# Patient Record
Sex: MALE | Race: BLACK OR AFRICAN AMERICAN | NOT HISPANIC OR LATINO | Employment: UNEMPLOYED | URBAN - METROPOLITAN AREA
[De-identification: names, ages, dates, MRNs, and addresses within clinical notes are randomized per-mention and may not be internally consistent; named-entity substitution may affect disease eponyms.]

---

## 2017-07-27 ENCOUNTER — HISTORICAL (OUTPATIENT)
Dept: RADIOLOGY | Facility: HOSPITAL | Age: 47
End: 2017-07-27

## 2017-07-27 LAB
ALBUMIN SERPL-MCNC: 4.1 GM/DL (ref 3.4–5)
ALBUMIN/GLOB SERPL: 1.2 RATIO (ref 1.1–2)
ALP SERPL-CCNC: 48 UNIT/L (ref 50–136)
ALT SERPL-CCNC: 33 UNIT/L (ref 12–78)
AST SERPL-CCNC: 28 UNIT/L (ref 15–37)
BILIRUB SERPL-MCNC: 1.1 MG/DL (ref 0.2–1)
BILIRUBIN DIRECT+TOT PNL SERPL-MCNC: 0.2 MG/DL (ref 0–0.5)
BILIRUBIN DIRECT+TOT PNL SERPL-MCNC: 0.9 MG/DL (ref 0–0.8)
BUN SERPL-MCNC: 15 MG/DL (ref 7–18)
CALCIUM SERPL-MCNC: 9 MG/DL (ref 8.5–10.1)
CHLORIDE SERPL-SCNC: 102 MMOL/L (ref 98–107)
CO2 SERPL-SCNC: 30 MMOL/L (ref 21–32)
CREAT SERPL-MCNC: 1.38 MG/DL (ref 0.7–1.3)
CREAT UR-MCNC: 30.2 MG/DL
ERYTHROCYTE [DISTWIDTH] IN BLOOD BY AUTOMATED COUNT: 14 % (ref 11.5–17)
GLOBULIN SER-MCNC: 3.4 GM/DL (ref 2.4–3.5)
GLUCOSE SERPL-MCNC: 84 MG/DL (ref 74–106)
HCT VFR BLD AUTO: 49.1 % (ref 42–52)
HGB BLD-MCNC: 17 GM/DL (ref 14–18)
MAGNESIUM SERPL-MCNC: 2.2 MG/DL (ref 1.8–2.4)
MCH RBC QN AUTO: 30.6 PG (ref 27–31)
MCHC RBC AUTO-ENTMCNC: 34.6 GM/DL (ref 33–36)
MCV RBC AUTO: 88.5 FL (ref 80–94)
PHOSPHATE SERPL-MCNC: 2.8 MG/DL (ref 2.5–4.9)
PLATELET # BLD AUTO: 192 X10(3)/MCL (ref 130–400)
PMV BLD AUTO: 11.5 FL (ref 9.4–12.4)
POTASSIUM SERPL-SCNC: 4.5 MMOL/L (ref 3.5–5.1)
PROT SERPL-MCNC: 7.5 GM/DL (ref 6.4–8.2)
PROT UR STRIP-MCNC: <5 MG/DL
PROT/CREAT UR-RTO: <0.2 MG/DL
PTH-INTACT SERPL-MCNC: 63 PG/DL (ref 14–72)
RBC # BLD AUTO: 5.55 X10(6)/MCL (ref 4.7–6.1)
SODIUM SERPL-SCNC: 140 MMOL/L (ref 136–145)
TSH SERPL-ACNC: 1.06 MIU/ML (ref 0.36–3.74)
URATE SERPL-MCNC: 6.2 MG/DL (ref 2.6–7.2)
WBC # SPEC AUTO: 5.9 X10(3)/MCL (ref 4.5–11.5)

## 2017-08-17 ENCOUNTER — HISTORICAL (OUTPATIENT)
Dept: ADMINISTRATIVE | Facility: HOSPITAL | Age: 47
End: 2017-08-17

## 2017-08-17 LAB
BUN SERPL-MCNC: 15 MG/DL (ref 7–18)
CALCIUM SERPL-MCNC: 8.8 MG/DL (ref 8.5–10.1)
CHLORIDE SERPL-SCNC: 106 MMOL/L (ref 98–107)
CO2 SERPL-SCNC: 28 MMOL/L (ref 21–32)
CREAT SERPL-MCNC: 1.32 MG/DL (ref 0.7–1.3)
GLUCOSE SERPL-MCNC: 105 MG/DL (ref 74–106)
POTASSIUM SERPL-SCNC: 4.2 MMOL/L (ref 3.5–5.1)
SODIUM SERPL-SCNC: 140 MMOL/L (ref 136–145)

## 2018-05-25 ENCOUNTER — HISTORICAL (OUTPATIENT)
Dept: ADMINISTRATIVE | Facility: HOSPITAL | Age: 48
End: 2018-05-25

## 2018-05-25 LAB
ALBUMIN SERPL-MCNC: 4.1 GM/DL (ref 3.4–5)
ALBUMIN/GLOB SERPL: 1.3 {RATIO}
ALP SERPL-CCNC: 49 UNIT/L (ref 50–136)
ALT SERPL-CCNC: 33 UNIT/L (ref 12–78)
AST SERPL-CCNC: 26 UNIT/L (ref 15–37)
BILIRUB SERPL-MCNC: 0.6 MG/DL (ref 0.2–1)
BILIRUBIN DIRECT+TOT PNL SERPL-MCNC: 0.2 MG/DL (ref 0–0.2)
BILIRUBIN DIRECT+TOT PNL SERPL-MCNC: 0.4 MG/DL (ref 0–0.8)
BUN SERPL-MCNC: 14 MG/DL (ref 7–18)
CALCIUM SERPL-MCNC: 8.6 MG/DL (ref 8.5–10.1)
CHLORIDE SERPL-SCNC: 105 MMOL/L (ref 98–107)
CHOLEST SERPL-MCNC: 145 MG/DL (ref 0–200)
CHOLEST/HDLC SERPL: 4.3 {RATIO} (ref 0–5)
CO2 SERPL-SCNC: 30 MMOL/L (ref 21–32)
CREAT SERPL-MCNC: 1.33 MG/DL (ref 0.7–1.3)
ERYTHROCYTE [DISTWIDTH] IN BLOOD BY AUTOMATED COUNT: 14.1 % (ref 11.5–17)
GLOBULIN SER-MCNC: 3.2 GM/DL (ref 2.4–3.5)
GLUCOSE SERPL-MCNC: 99 MG/DL (ref 74–106)
HCT VFR BLD AUTO: 49.2 % (ref 42–52)
HDLC SERPL-MCNC: 34 MG/DL (ref 35–60)
HGB BLD-MCNC: 16.9 GM/DL (ref 14–18)
LDLC SERPL CALC-MCNC: 92 MG/DL (ref 0–129)
MCH RBC QN AUTO: 31.2 PG (ref 27–31)
MCHC RBC AUTO-ENTMCNC: 34.3 GM/DL (ref 33–36)
MCV RBC AUTO: 90.8 FL (ref 80–94)
PLATELET # BLD AUTO: 181 X10(3)/MCL (ref 130–400)
PMV BLD AUTO: 10.5 FL (ref 9.4–12.4)
POTASSIUM SERPL-SCNC: 4.4 MMOL/L (ref 3.5–5.1)
PROT SERPL-MCNC: 7.3 GM/DL (ref 6.4–8.2)
PSA SERPL-MCNC: 1.53 NG/ML (ref 0–4)
RBC # BLD AUTO: 5.42 X10(6)/MCL (ref 4.7–6.1)
SODIUM SERPL-SCNC: 141 MMOL/L (ref 136–145)
TRIGL SERPL-MCNC: 97 MG/DL (ref 30–150)
VLDLC SERPL CALC-MCNC: 19 MG/DL
WBC # SPEC AUTO: 4.6 X10(3)/MCL (ref 4.5–11.5)

## 2018-06-04 ENCOUNTER — HISTORICAL (OUTPATIENT)
Dept: RADIOLOGY | Facility: HOSPITAL | Age: 48
End: 2018-06-04

## 2019-12-11 ENCOUNTER — HISTORICAL (OUTPATIENT)
Dept: ADMINISTRATIVE | Facility: HOSPITAL | Age: 49
End: 2019-12-11

## 2019-12-11 LAB
ABS NEUT (OLG): 2.95 X10(3)/MCL (ref 2.1–9.2)
BUN SERPL-MCNC: 12 MG/DL (ref 7–18)
CALCIUM SERPL-MCNC: 9.1 MG/DL (ref 8.5–10.1)
CHLORIDE SERPL-SCNC: 103 MMOL/L (ref 98–107)
CHOLEST SERPL-MCNC: 132 MG/DL (ref 0–200)
CHOLEST/HDLC SERPL: 4.9 {RATIO} (ref 0–5)
CO2 SERPL-SCNC: 30 MMOL/L (ref 21–32)
CREAT SERPL-MCNC: 1.29 MG/DL (ref 0.7–1.3)
CREAT/UREA NIT SERPL: 9.3
DACRYOCYTES BLD QL SMEAR: 1 /MCL (ref 0–1)
ERYTHROCYTE [DISTWIDTH] IN BLOOD BY AUTOMATED COUNT: 13.8 % (ref 11.5–17)
GLUCOSE SERPL-MCNC: 85 MG/DL (ref 74–106)
H PYLORI AB SER IA-ACNC: NEGATIVE
HCT VFR BLD AUTO: 47.9 % (ref 42–52)
HDLC SERPL-MCNC: 27 MG/DL (ref 35–60)
HGB BLD-MCNC: 16.1 GM/DL (ref 14–18)
LDLC SERPL CALC-MCNC: 83 MG/DL (ref 0–129)
LYMPHOCYTES NFR BLD MANUAL: 29 % (ref 13–40)
MCH RBC QN AUTO: 29.7 PG (ref 27–31)
MCHC RBC AUTO-ENTMCNC: 33.6 GM/DL (ref 33–36)
MCV RBC AUTO: 88.2 FL (ref 80–94)
MONOCYTES NFR BLD MANUAL: 5 % (ref 2–11)
NEUTROPHILS NFR BLD MANUAL: 66 % (ref 47–80)
PLATELET # BLD AUTO: 197 X10(3)/MCL (ref 130–400)
PLATELET # BLD EST: NORMAL 10*3/UL
PMV BLD AUTO: 10.4 FL (ref 9.4–12.4)
POTASSIUM SERPL-SCNC: 4.3 MMOL/L (ref 3.5–5.1)
PSA SERPL-MCNC: 2.87 NG/ML (ref 0–4)
RBC # BLD AUTO: 5.43 X10(6)/MCL (ref 4.7–6.1)
SODIUM SERPL-SCNC: 138 MMOL/L (ref 136–145)
TRIGL SERPL-MCNC: 111 MG/DL (ref 30–150)
TSH SERPL-ACNC: 1.18 MIU/L (ref 0.36–3.74)
VLDLC SERPL CALC-MCNC: 22 MG/DL
WBC # SPEC AUTO: 4.6 X10(3)/MCL (ref 4.5–11.5)

## 2021-01-22 ENCOUNTER — HISTORICAL (OUTPATIENT)
Dept: ADMINISTRATIVE | Facility: HOSPITAL | Age: 51
End: 2021-01-22

## 2021-01-22 LAB
ALBUMIN SERPL-MCNC: 4.1 GM/DL (ref 3.5–5)
ALP SERPL-CCNC: 51 UNIT/L (ref 40–150)
ALT SERPL-CCNC: 26 UNIT/L (ref 0–55)
AST SERPL-CCNC: 24 UNIT/L (ref 5–34)
BILIRUB SERPL-MCNC: 1 MG/DL
BILIRUBIN DIRECT+TOT PNL SERPL-MCNC: 0.4 MG/DL (ref 0–0.5)
BILIRUBIN DIRECT+TOT PNL SERPL-MCNC: 0.6 MG/DL (ref 0–0.8)
CHOLEST SERPL-MCNC: 149 MG/DL
CHOLEST/HDLC SERPL: 4 {RATIO} (ref 0–5)
HDLC SERPL-MCNC: 37 MG/DL (ref 35–60)
LDLC SERPL CALC-MCNC: 93 MG/DL (ref 50–140)
LIVER PROFILE INTERP: NORMAL
PROT SERPL-MCNC: 6.8 GM/DL (ref 6.4–8.3)
TRIGL SERPL-MCNC: 93 MG/DL (ref 34–140)
VLDLC SERPL CALC-MCNC: 19 MG/DL

## 2021-09-09 ENCOUNTER — HISTORICAL (OUTPATIENT)
Dept: ADMINISTRATIVE | Facility: HOSPITAL | Age: 51
End: 2021-09-09

## 2021-09-09 LAB
ABS NEUT (OLG): 2.98 X10(3)/MCL (ref 2.1–9.2)
ALBUMIN SERPL-MCNC: 4.1 GM/DL (ref 3.5–5)
ALBUMIN/GLOB SERPL: 1.5 RATIO (ref 1.1–2)
ALP SERPL-CCNC: 49 UNIT/L (ref 40–150)
ALT SERPL-CCNC: 18 UNIT/L (ref 0–55)
AST SERPL-CCNC: 21 UNIT/L (ref 5–34)
BASOPHILS # BLD AUTO: 0 X10(3)/MCL (ref 0–0.2)
BASOPHILS NFR BLD AUTO: 0 %
BILIRUB SERPL-MCNC: 1.3 MG/DL
BILIRUBIN DIRECT+TOT PNL SERPL-MCNC: 0.4 MG/DL (ref 0–0.5)
BILIRUBIN DIRECT+TOT PNL SERPL-MCNC: 0.9 MG/DL (ref 0–0.8)
BUN SERPL-MCNC: 14.1 MG/DL (ref 8.9–20.6)
CALCIUM SERPL-MCNC: 9.2 MG/DL (ref 8.4–10.2)
CHLORIDE SERPL-SCNC: 105 MMOL/L (ref 98–107)
CHOLEST SERPL-MCNC: 154 MG/DL
CHOLEST/HDLC SERPL: 5 {RATIO} (ref 0–5)
CO2 SERPL-SCNC: 28 MMOL/L (ref 22–29)
CREAT SERPL-MCNC: 1.38 MG/DL (ref 0.73–1.18)
DEPRECATED CALCIDIOL+CALCIFEROL SERPL-MC: 14.8 NG/ML (ref 30–80)
EOSINOPHIL # BLD AUTO: 0.1 X10(3)/MCL (ref 0–0.9)
EOSINOPHIL NFR BLD AUTO: 2 %
ERYTHROCYTE [DISTWIDTH] IN BLOOD BY AUTOMATED COUNT: 14.3 % (ref 11.5–17)
EST. AVERAGE GLUCOSE BLD GHB EST-MCNC: 108.3 MG/DL
GLOBULIN SER-MCNC: 2.7 GM/DL (ref 2.4–3.5)
GLUCOSE SERPL-MCNC: 98 MG/DL (ref 74–100)
GROUP & RH: NORMAL
HBA1C MFR BLD: 5.4 %
HCT VFR BLD AUTO: 51 % (ref 42–52)
HDLC SERPL-MCNC: 34 MG/DL (ref 35–60)
HGB BLD-MCNC: 17.7 GM/DL (ref 14–18)
LDLC SERPL CALC-MCNC: 100 MG/DL (ref 50–140)
LYMPHOCYTES # BLD AUTO: 1.1 X10(3)/MCL (ref 0.6–4.6)
LYMPHOCYTES NFR BLD AUTO: 25 %
MCH RBC QN AUTO: 30.2 PG (ref 27–31)
MCHC RBC AUTO-ENTMCNC: 34.7 GM/DL (ref 33–36)
MCV RBC AUTO: 87 FL (ref 80–94)
MONOCYTES # BLD AUTO: 0.4 X10(3)/MCL (ref 0.1–1.3)
MONOCYTES NFR BLD AUTO: 8 %
NEUTROPHILS # BLD AUTO: 2.98 X10(3)/MCL (ref 2.1–9.2)
NEUTROPHILS NFR BLD AUTO: 65 %
PLATELET # BLD AUTO: 183 X10(3)/MCL (ref 130–400)
PMV BLD AUTO: 12.2 FL (ref 9.4–12.4)
POTASSIUM SERPL-SCNC: 4.4 MMOL/L (ref 3.5–5.1)
PROT SERPL-MCNC: 6.8 GM/DL (ref 6.4–8.3)
RBC # BLD AUTO: 5.86 X10(6)/MCL (ref 4.7–6.1)
SODIUM SERPL-SCNC: 140 MMOL/L (ref 136–145)
TRIGL SERPL-MCNC: 101 MG/DL (ref 34–140)
TSH SERPL-ACNC: 1.22 UIU/ML (ref 0.35–4.94)
VLDLC SERPL CALC-MCNC: 20 MG/DL
WBC # SPEC AUTO: 4.6 X10(3)/MCL (ref 4.5–11.5)

## 2021-12-21 LAB — CRC RECOMMENDATION EXT: NORMAL

## 2022-03-09 ENCOUNTER — HISTORICAL (OUTPATIENT)
Dept: LAB | Facility: HOSPITAL | Age: 52
End: 2022-03-09

## 2022-03-09 LAB — BNP BLD-MCNC: 1270.7 PG/ML

## 2022-04-01 ENCOUNTER — HISTORICAL (OUTPATIENT)
Dept: ADMINISTRATIVE | Facility: HOSPITAL | Age: 52
End: 2022-04-01

## 2022-04-01 ENCOUNTER — HISTORICAL (OUTPATIENT)
Dept: RADIOLOGY | Facility: HOSPITAL | Age: 52
End: 2022-04-01

## 2022-04-07 ENCOUNTER — HISTORICAL (OUTPATIENT)
Dept: ADMINISTRATIVE | Facility: HOSPITAL | Age: 52
End: 2022-04-07

## 2022-04-07 LAB
ALBUMIN SERPL-MCNC: 3.8 G/DL (ref 3.5–5)
ALBUMIN/GLOB SERPL: 1.5 {RATIO} (ref 1.1–2)
ALP SERPL-CCNC: 46 U/L (ref 40–150)
ALT SERPL-CCNC: 14 U/L (ref 0–55)
AST SERPL-CCNC: 21 U/L (ref 5–34)
BILIRUB SERPL-MCNC: 1.2 MG/DL
BILIRUBIN DIRECT+TOT PNL SERPL-MCNC: 0.4 (ref 0–0.5)
BILIRUBIN DIRECT+TOT PNL SERPL-MCNC: 0.8 (ref 0–0.8)
BUN SERPL-MCNC: 16.2 MG/DL (ref 8.4–25.7)
CALCIUM SERPL-MCNC: 8.9 MG/DL (ref 8.7–10.5)
CHLORIDE SERPL-SCNC: 104 MMOL/L (ref 98–107)
CO2 SERPL-SCNC: 23 MMOL/L (ref 22–29)
CREAT SERPL-MCNC: 1.36 MG/DL (ref 0.73–1.18)
GLOBULIN SER-MCNC: 2.6 G/DL (ref 2.4–3.5)
GLUCOSE SERPL-MCNC: 74 MG/DL (ref 74–100)
HEMOLYSIS INTERF INDEX SERPL-ACNC: 67
ICTERIC INTERF INDEX SERPL-ACNC: 1
LIPEMIC INTERF INDEX SERPL-ACNC: 4
POTASSIUM SERPL-SCNC: 4.6 MMOL/L (ref 3.5–5.1)
PROT SERPL-MCNC: 6.4 G/DL (ref 6.4–8.3)
SODIUM SERPL-SCNC: 136 MMOL/L (ref 136–145)

## 2022-04-10 ENCOUNTER — HISTORICAL (OUTPATIENT)
Dept: ADMINISTRATIVE | Facility: HOSPITAL | Age: 52
End: 2022-04-10
Payer: MEDICAID

## 2022-04-18 ENCOUNTER — HISTORICAL (OUTPATIENT)
Dept: ADMINISTRATIVE | Facility: HOSPITAL | Age: 52
End: 2022-04-18
Payer: MEDICAID

## 2022-04-18 LAB
ABS NEUT (OLG): 3.14 (ref 2.1–9.2)
BASOPHILS # BLD AUTO: 0 10*3/UL (ref 0–0.2)
BASOPHILS NFR BLD AUTO: 0 %
BUN SERPL-MCNC: 17.1 MG/DL (ref 8.4–25.7)
CALCIUM SERPL-MCNC: 9 MG/DL (ref 8.7–10.5)
CHLORIDE SERPL-SCNC: 105 MMOL/L (ref 98–107)
CO2 SERPL-SCNC: 27 MMOL/L (ref 22–29)
CREAT SERPL-MCNC: 1.42 MG/DL (ref 0.73–1.18)
CREAT/UREA NIT SERPL: 12
EOSINOPHIL # BLD AUTO: 0.1 10*3/UL (ref 0–0.9)
EOSINOPHIL NFR BLD AUTO: 2 %
ERYTHROCYTE [DISTWIDTH] IN BLOOD BY AUTOMATED COUNT: 14 % (ref 11.5–17)
GLUCOSE SERPL-MCNC: 85 MG/DL (ref 74–100)
HCT VFR BLD AUTO: 48.7 % (ref 42–52)
HEMOLYSIS INTERF INDEX SERPL-ACNC: 5
HGB BLD-MCNC: 16.9 G/DL (ref 14–18)
ICTERIC INTERF INDEX SERPL-ACNC: 1
LIPEMIC INTERF INDEX SERPL-ACNC: 0
LYMPHOCYTES # BLD AUTO: 0.9 10*3/UL (ref 0.6–4.6)
LYMPHOCYTES NFR BLD AUTO: 20 %
MANUAL DIFF? (OHS): NO
MCH RBC QN AUTO: 30.1 PG (ref 27–31)
MCHC RBC AUTO-ENTMCNC: 34.7 G/DL (ref 33–36)
MCV RBC AUTO: 86.8 FL (ref 80–94)
MONOCYTES # BLD AUTO: 0.4 10*3/UL (ref 0.1–1.3)
MONOCYTES NFR BLD AUTO: 9 %
NEUTROPHILS # BLD AUTO: 3.14 10*3/UL (ref 2.1–9.2)
NEUTROPHILS NFR BLD AUTO: 68 %
PLATELET # BLD AUTO: 176 10*3/UL (ref 130–400)
PMV BLD AUTO: 12.1 FL (ref 9.4–12.4)
POTASSIUM SERPL-SCNC: 4.4 MMOL/L (ref 3.5–5.1)
RBC # BLD AUTO: 5.61 10*6/UL (ref 4.7–6.1)
SODIUM SERPL-SCNC: 138 MMOL/L (ref 136–145)
WBC # SPEC AUTO: 4.6 10*3/UL (ref 4.5–11.5)

## 2022-04-19 LAB — SARS-COV-2 RNA RESP QL NAA+PROBE: NOT DETECTED

## 2022-04-21 ENCOUNTER — HISTORICAL (OUTPATIENT)
Dept: CARDIOLOGY | Facility: HOSPITAL | Age: 52
End: 2022-04-21
Payer: MEDICAID

## 2022-04-28 VITALS
SYSTOLIC BLOOD PRESSURE: 118 MMHG | WEIGHT: 194 LBS | DIASTOLIC BLOOD PRESSURE: 83 MMHG | OXYGEN SATURATION: 99 % | BODY MASS INDEX: 29.4 KG/M2 | HEIGHT: 68 IN

## 2022-05-05 ENCOUNTER — LAB VISIT (OUTPATIENT)
Dept: LAB | Facility: HOSPITAL | Age: 52
End: 2022-05-05
Attending: INTERNAL MEDICINE
Payer: COMMERCIAL

## 2022-05-05 DIAGNOSIS — I48.0 PAROXYSMAL ATRIAL FIBRILLATION: ICD-10-CM

## 2022-05-05 DIAGNOSIS — I42.8 OBSCURE CARDIOMYOPATHY OF AFRICA: Primary | ICD-10-CM

## 2022-05-05 LAB
ANION GAP SERPL CALC-SCNC: 8 MEQ/L
BUN SERPL-MCNC: 13.1 MG/DL (ref 8.4–25.7)
CALCIUM SERPL-MCNC: 9 MG/DL (ref 8.4–10.2)
CHLORIDE SERPL-SCNC: 105 MMOL/L (ref 98–107)
CO2 SERPL-SCNC: 28 MMOL/L (ref 22–29)
CREAT SERPL-MCNC: 1.31 MG/DL (ref 0.73–1.18)
CREAT/UREA NIT SERPL: 10
GLUCOSE SERPL-MCNC: 85 MG/DL (ref 74–100)
POTASSIUM SERPL-SCNC: 4.6 MMOL/L (ref 3.5–5.1)
SODIUM SERPL-SCNC: 141 MMOL/L (ref 136–145)

## 2022-05-05 PROCEDURE — 36415 COLL VENOUS BLD VENIPUNCTURE: CPT

## 2022-05-05 PROCEDURE — 80048 BASIC METABOLIC PNL TOTAL CA: CPT

## 2022-05-19 DIAGNOSIS — I42.8 CARDIOMYOPATHY, NONISCHEMIC: Primary | ICD-10-CM

## 2022-05-19 DIAGNOSIS — I50.22 CHRONIC SYSTOLIC HEART FAILURE: ICD-10-CM

## 2022-06-15 DIAGNOSIS — I42.8 OBSCURE CARDIOMYOPATHY OF AFRICA: Primary | ICD-10-CM

## 2022-06-16 ENCOUNTER — LAB VISIT (OUTPATIENT)
Dept: LAB | Facility: HOSPITAL | Age: 52
End: 2022-06-16
Attending: INTERNAL MEDICINE
Payer: COMMERCIAL

## 2022-06-16 DIAGNOSIS — I48.0 PAF (PAROXYSMAL ATRIAL FIBRILLATION): Primary | ICD-10-CM

## 2022-06-16 LAB
ANION GAP SERPL CALC-SCNC: 9 MEQ/L
BUN SERPL-MCNC: 13.2 MG/DL (ref 8.4–25.7)
CALCIUM SERPL-MCNC: 9.5 MG/DL (ref 8.4–10.2)
CHLORIDE SERPL-SCNC: 103 MMOL/L (ref 98–107)
CO2 SERPL-SCNC: 29 MMOL/L (ref 22–29)
CREAT SERPL-MCNC: 1.35 MG/DL (ref 0.73–1.18)
CREAT/UREA NIT SERPL: 10
GLUCOSE SERPL-MCNC: 95 MG/DL (ref 74–100)
POTASSIUM SERPL-SCNC: 4.2 MMOL/L (ref 3.5–5.1)
SODIUM SERPL-SCNC: 141 MMOL/L (ref 136–145)

## 2022-06-16 PROCEDURE — 80048 BASIC METABOLIC PNL TOTAL CA: CPT

## 2022-06-16 PROCEDURE — 36415 COLL VENOUS BLD VENIPUNCTURE: CPT

## 2022-06-24 ENCOUNTER — HOSPITAL ENCOUNTER (OUTPATIENT)
Dept: RADIOLOGY | Facility: HOSPITAL | Age: 52
Discharge: HOME | End: 2022-06-24
Attending: INTERNAL MEDICINE
Payer: COMMERCIAL

## 2022-06-24 DIAGNOSIS — I50.22 CHRONIC SYSTOLIC HEART FAILURE: ICD-10-CM

## 2022-06-24 DIAGNOSIS — I42.8 CARDIOMYOPATHY, NONISCHEMIC: ICD-10-CM

## 2022-06-24 PROCEDURE — 78472 GATED HEART PLANAR SINGLE: CPT | Mod: TC

## 2022-08-23 ENCOUNTER — TELEPHONE (OUTPATIENT)
Dept: FAMILY MEDICINE | Facility: CLINIC | Age: 52
End: 2022-08-23
Payer: COMMERCIAL

## 2022-08-23 RX ORDER — AZITHROMYCIN 250 MG/1
TABLET, FILM COATED ORAL
Qty: 6 TABLET | Refills: 0 | Status: SHIPPED | OUTPATIENT
Start: 2022-08-23 | End: 2022-08-28

## 2022-08-23 NOTE — TELEPHONE ENCOUNTER
Wife called and reported home test was positive for COVID. C/o, due to 's health status is requesting meds. FNP aware, new order for Z-pack to be sent to pharmacy. Pt aware. Pt instructed to quarantine for 5 days pending s/s, treat s/s with OTC, flds encouraged and if any worsening symptoms, go to the emergency department. Understanding voiced.

## 2022-08-29 ENCOUNTER — DOCUMENTATION ONLY (OUTPATIENT)
Dept: ADMINISTRATIVE | Facility: HOSPITAL | Age: 52
End: 2022-08-29
Payer: COMMERCIAL

## 2022-09-14 NOTE — PROCEDURES
Patient:   Clifton Davidson             MRN: [id number]            FIN: [id number]               Age:   51 years     Sex:  Male     :  1970   Associated Diagnoses:   None   Author:   Gerard Pinzon MD         Procedure: DARBY     Final impression:    Dilated  LV with severe globally reduced LV systolic function, LVEF 20-25%.No LV thrombus noted.  Small PFO with bidirectional shunt L>R ( Left shunt noted on color Doppler and right shunt noted by by bubble study). The shunt < 30 microbubbles in the left atrium within 3 cardiac cycles, after opacification of the right atrium. May not meet criteria for PFO closure.  Rightward bulging of the interatrial septum indicative for elevated LA pressure.  Mild-moderate functional jet of MR  AICD wires noted in RA and RV. Note made of multiple linear oblong hypermobile echodensities attached to RA wire at the SCV/RA junction, highly suspicious for thrombus but cannot exclude vegetation, clinical correlation is needed        Indication: PFO  Informed consent: obtained, signed copy placed in the chart.  Description of the procedure: After sedation was achieved (please see anesthesia report for details), the esophagus intubated without difficulties. Multiple orthogonal views obtained. Patient tolerated procedure without immediate complications noted.  Findings:  Left atrium (LA): Normal LA size.  Left atrial appendage (LES): No LES thrombus is present.  Interatrial septum:  Small PFO with bidirectional shunt L>R ( Left shunt noted on color Doppler and right shunt noted by by bubble study). The shunt < 30 microbubbles in the left atrium within 3 cardiac cycles, after opacification of the right atrium. May not meet criteria for PFO closure. Rightward bulging of the interatrial septum indicative for elevated LA pressure.  Right atrium (RA): Normal RA size. AICD wires noted in RA and RV. Note made of multiple linear oblong hypermobile echodensities attached to RA wire at the  SCV/RA junction, highly suspicious for thrombus but cannot exclude vegetation, clinical correlation is needed  Left ventricle (LV): Dilated  LV with severe globally reduced LV systolic function, LVEF 20-25%. Normal LV wall thickness. No LV thrombus noted.  Right ventricle (RV): Partially visualized in this study.  Aortic valve: Trileaflet.  No Aortic stenosis.   No aortic regurgitation. No Vegetation is present.  Mitral valve: No mitral annular calcification is present.  No Mitral stenosis.  No Mitral valve prolapse. Mild-moderate functional jet of MR No Vegetation is present.  Tricuspid valve: Partially visualized, No Tricuspid stenosis. Trace -mild Tricuspid regurgitation. No Vegetation is present.  Pulmonic valve: Not well visualized, no hemodynamically significant pulmonic stenosis, no pulmonic regurgitation noted in this study. No Vegetation is present.  Thoracic aorta: No Atherosclerotic changes of the descending thoracic aorta.  Normal Caliber aortic root.   Normal Caliber Proximal portion of the ascending aorta.   Pericardium: No significant pericardial effusion is present.

## 2022-09-29 DIAGNOSIS — Z00.00 WELLNESS EXAMINATION: Primary | ICD-10-CM

## 2022-10-24 ENCOUNTER — HOSPITAL ENCOUNTER (EMERGENCY)
Facility: HOSPITAL | Age: 52
Discharge: HOME | End: 2022-10-24
Attending: EMERGENCY MEDICINE
Payer: COMMERCIAL

## 2022-10-24 VITALS
OXYGEN SATURATION: 99 % | SYSTOLIC BLOOD PRESSURE: 127 MMHG | WEIGHT: 175 LBS | RESPIRATION RATE: 18 BRPM | HEART RATE: 83 BPM | BODY MASS INDEX: 26.52 KG/M2 | TEMPERATURE: 100 F | DIASTOLIC BLOOD PRESSURE: 82 MMHG | HEIGHT: 68 IN

## 2022-10-24 DIAGNOSIS — J10.1 INFLUENZA A: Primary | ICD-10-CM

## 2022-10-24 LAB
FLUAV AG UPPER RESP QL IA.RAPID: DETECTED
FLUBV AG UPPER RESP QL IA.RAPID: NOT DETECTED
SARS-COV-2 RNA RESP QL NAA+PROBE: NOT DETECTED
STREP A PCR (OHS): NOT DETECTED

## 2022-10-24 PROCEDURE — 99284 EMERGENCY DEPT VISIT MOD MDM: CPT | Mod: 25

## 2022-10-24 PROCEDURE — 0241U COVID/FLU A&B PCR: CPT | Performed by: NURSE PRACTITIONER

## 2022-10-24 PROCEDURE — 87651 STREP A DNA AMP PROBE: CPT | Performed by: NURSE PRACTITIONER

## 2022-10-24 RX ORDER — OSELTAMIVIR PHOSPHATE 75 MG/1
75 CAPSULE ORAL 2 TIMES DAILY
Qty: 10 CAPSULE | Refills: 0 | Status: SHIPPED | OUTPATIENT
Start: 2022-10-24 | End: 2022-10-29

## 2022-10-24 RX ORDER — PROMETHAZINE HYDROCHLORIDE AND DEXTROMETHORPHAN HYDROBROMIDE 6.25; 15 MG/5ML; MG/5ML
5 SYRUP ORAL EVERY 8 HOURS PRN
Qty: 180 ML | Refills: 0 | Status: SHIPPED | OUTPATIENT
Start: 2022-10-24 | End: 2022-11-03

## 2022-10-24 NOTE — Clinical Note
"Clifton "Clifton" Lety was seen and treated in our emergency department on 10/24/2022.  He may return to work on 10/31/2022.       If you have any questions or concerns, please don't hesitate to call.      ERIK Hoover"

## 2022-10-24 NOTE — ED PROVIDER NOTES
Encounter Date: 10/24/2022       History     Chief Complaint   Patient presents with    Chills     Complains of chills and body aches     52 year old male presents with cough, congestion, body aches and headache since yesterday. He denies NVD. He reports no runny nose or sore throat. He does admit to positive influenza exposure on Friday.     The history is provided by the patient. No  was used.   Review of patient's allergies indicates:   Allergen Reactions    Crab Swelling     Allergic to dungenous crab which causes lips to swell., no allergy to contrast media.     Past Medical History:   Diagnosis Date    Personal history of colonic polyps 12/21/2021     Past Surgical History:   Procedure Laterality Date    COLONOSCOPY  12/21/2021     No family history on file.  Social History     Tobacco Use    Smoking status: Never    Smokeless tobacco: Never   Substance Use Topics    Alcohol use: Not Currently     Review of Systems   Constitutional:  Positive for chills. Negative for fever.   HENT:  Positive for congestion. Negative for rhinorrhea and sore throat.    Respiratory:  Positive for cough. Negative for shortness of breath.    Gastrointestinal:  Negative for diarrhea and vomiting.   Musculoskeletal:  Positive for myalgias.   Skin:  Negative for rash.   All other systems reviewed and are negative.    Physical Exam     Initial Vitals [10/24/22 1709]   BP Pulse Resp Temp SpO2   127/82 83 18 99.7 °F (37.6 °C) 99 %      MAP       --         Physical Exam    Constitutional: He appears well-developed and well-nourished.   HENT:   Head: Normocephalic.   Mouth/Throat: Oropharynx is clear and moist.   Eyes: EOM are normal.   Neck: Neck supple.   Normal range of motion.  Cardiovascular:  Normal rate, regular rhythm and normal heart sounds.           Pulmonary/Chest: Breath sounds normal. No respiratory distress.   Abdominal: He exhibits no distension. There is no abdominal tenderness.   Musculoskeletal:          General: Normal range of motion.      Cervical back: Normal range of motion and neck supple.     Neurological: He is alert and oriented to person, place, and time. GCS score is 15. GCS eye subscore is 4. GCS verbal subscore is 5. GCS motor subscore is 6.   Skin: Skin is warm and dry. Capillary refill takes less than 2 seconds. No rash noted.   Psychiatric: He has a normal mood and affect.       ED Course   Procedures  Labs Reviewed   COVID/FLU A&B PCR - Abnormal; Notable for the following components:       Result Value    Influenza A PCR Detected (*)     All other components within normal limits    Narrative:     The Xpert Xpress SARS-CoV-2/FLU/RSV plus is a rapid, multiplexed real-time PCR test intended for the simultaneous qualitative detection and differentiation of SARS-CoV-2, Influenza A, Influenza B, and respiratory syncytial virus (RSV) viral RNA in either nasopharyngeal swab or nasal swab specimens.         STREP GROUP A BY PCR - Normal    Narrative:     The Xpert Xpress Strep A test is a rapid, qualitative in vitro diagnostic test for the detection of Streptococcus pyogenes (Group A ß-hemolytic Streptococcus, Strep A) in throat swab specimens from patients with signs and symptoms of pharyngitis.            Imaging Results    None          Medications - No data to display  Medical Decision Making:   Differential Diagnosis:   COVID, influenza, strep throat  Clinical Tests:   Lab Tests: Ordered and Reviewed       <> Summary of Lab: Positive for influenza A  ED Management:  Patient positive for influenza A. He is in no acute distress. Tamiflu prescribed. Patient instructed on OTC medications.                         Clinical Impression:   Final diagnoses:  [J10.1] Influenza A (Primary)      ED Disposition Condition    Discharge Stable          ED Prescriptions       Medication Sig Dispense Start Date End Date Auth. Provider    oseltamivir (TAMIFLU) 75 MG capsule Take 1 capsule (75 mg total) by mouth 2  (two) times daily. for 5 days 10 capsule 10/24/2022 10/29/2022 ERIK Hoover          Follow-up Information    None          ERIK Hoover  10/24/22 3711

## 2022-10-24 NOTE — DISCHARGE INSTRUCTIONS
Tamiflu twice a day  Drink lots of fluids  Alternate Tylenol 100mg with Motrin 800mg every 4 hours as needed for fever/body aches

## 2022-11-17 ENCOUNTER — LAB VISIT (OUTPATIENT)
Dept: LAB | Facility: HOSPITAL | Age: 52
End: 2022-11-17
Attending: NURSE PRACTITIONER
Payer: COMMERCIAL

## 2022-11-17 DIAGNOSIS — Z00.00 WELLNESS EXAMINATION: ICD-10-CM

## 2022-11-17 LAB
ALBUMIN SERPL-MCNC: 3.9 GM/DL (ref 3.5–5)
ALBUMIN/GLOB SERPL: 1.5 RATIO (ref 1.1–2)
ALP SERPL-CCNC: 42 UNIT/L (ref 40–150)
ALT SERPL-CCNC: 13 UNIT/L (ref 0–55)
AST SERPL-CCNC: 18 UNIT/L (ref 5–34)
BASOPHILS # BLD AUTO: 0.02 X10(3)/MCL (ref 0–0.2)
BASOPHILS NFR BLD AUTO: 0.5 %
BILIRUBIN DIRECT+TOT PNL SERPL-MCNC: 1.3 MG/DL
BUN SERPL-MCNC: 15.3 MG/DL (ref 8.4–25.7)
CALCIUM SERPL-MCNC: 9.4 MG/DL (ref 8.4–10.2)
CHLORIDE SERPL-SCNC: 104 MMOL/L (ref 98–107)
CHOLEST SERPL-MCNC: 153 MG/DL
CHOLEST/HDLC SERPL: 4 {RATIO} (ref 0–5)
CO2 SERPL-SCNC: 26 MMOL/L (ref 22–29)
CREAT SERPL-MCNC: 1.3 MG/DL (ref 0.73–1.18)
CREAT UR-MCNC: 324.3 MG/DL (ref 63–166)
DEPRECATED CALCIDIOL+CALCIFEROL SERPL-MC: 17.8 NG/ML (ref 30–80)
EOSINOPHIL # BLD AUTO: 0.13 X10(3)/MCL (ref 0–0.9)
EOSINOPHIL NFR BLD AUTO: 3 %
ERYTHROCYTE [DISTWIDTH] IN BLOOD BY AUTOMATED COUNT: 15.5 % (ref 11.5–17)
EST. AVERAGE GLUCOSE BLD GHB EST-MCNC: 105.4 MG/DL
GFR SERPLBLD CREATININE-BSD FMLA CKD-EPI: >60 MLS/MIN/1.73/M2
GLOBULIN SER-MCNC: 2.6 GM/DL (ref 2.4–3.5)
GLUCOSE SERPL-MCNC: 97 MG/DL (ref 74–100)
HBA1C MFR BLD: 5.3 %
HCT VFR BLD AUTO: 48.9 % (ref 42–52)
HDLC SERPL-MCNC: 37 MG/DL (ref 35–60)
HGB BLD-MCNC: 16.3 GM/DL (ref 14–18)
IMM GRANULOCYTES # BLD AUTO: 0.01 X10(3)/MCL (ref 0–0.04)
IMM GRANULOCYTES NFR BLD AUTO: 0.2 %
LDLC SERPL CALC-MCNC: 98 MG/DL (ref 50–140)
LYMPHOCYTES # BLD AUTO: 0.93 X10(3)/MCL (ref 0.6–4.6)
LYMPHOCYTES NFR BLD AUTO: 21.5 %
MCH RBC QN AUTO: 29.9 PG (ref 27–31)
MCHC RBC AUTO-ENTMCNC: 33.3 MG/DL (ref 33–36)
MCV RBC AUTO: 89.7 FL (ref 80–94)
MICROALBUMIN UR-MCNC: 66.1 UG/ML
MICROALBUMIN/CREAT RATIO PNL UR: 20.4 MG/GM CR (ref 0–30)
MONOCYTES # BLD AUTO: 0.43 X10(3)/MCL (ref 0.1–1.3)
MONOCYTES NFR BLD AUTO: 10 %
NEUTROPHILS # BLD AUTO: 2.8 X10(3)/MCL (ref 2.1–9.2)
NEUTROPHILS NFR BLD AUTO: 64.8 %
NRBC BLD AUTO-RTO: 0 %
PLATELET # BLD AUTO: 164 X10(3)/MCL (ref 130–400)
PMV BLD AUTO: 12.2 FL (ref 7.4–10.4)
POTASSIUM SERPL-SCNC: 3.9 MMOL/L (ref 3.5–5.1)
PROT SERPL-MCNC: 6.5 GM/DL (ref 6.4–8.3)
PSA SERPL-MCNC: 0.88 NG/ML
RBC # BLD AUTO: 5.45 X10(6)/MCL (ref 4.7–6.1)
SODIUM SERPL-SCNC: 138 MMOL/L (ref 136–145)
TRIGL SERPL-MCNC: 88 MG/DL (ref 34–140)
TSH SERPL-ACNC: 1.44 UIU/ML (ref 0.35–4.94)
VLDLC SERPL CALC-MCNC: 18 MG/DL
WBC # SPEC AUTO: 4.3 X10(3)/MCL (ref 4.5–11.5)

## 2022-11-17 PROCEDURE — 83036 HEMOGLOBIN GLYCOSYLATED A1C: CPT

## 2022-11-17 PROCEDURE — 82043 UR ALBUMIN QUANTITATIVE: CPT

## 2022-11-17 PROCEDURE — 85025 COMPLETE CBC W/AUTO DIFF WBC: CPT

## 2022-11-17 PROCEDURE — 80053 COMPREHEN METABOLIC PANEL: CPT

## 2022-11-17 PROCEDURE — 82306 VITAMIN D 25 HYDROXY: CPT

## 2022-11-17 PROCEDURE — 84153 ASSAY OF PSA TOTAL: CPT

## 2022-11-17 PROCEDURE — 84443 ASSAY THYROID STIM HORMONE: CPT

## 2022-11-17 PROCEDURE — 36415 COLL VENOUS BLD VENIPUNCTURE: CPT

## 2022-11-17 PROCEDURE — 80061 LIPID PANEL: CPT

## 2022-11-21 ENCOUNTER — OFFICE VISIT (OUTPATIENT)
Dept: FAMILY MEDICINE | Facility: CLINIC | Age: 52
End: 2022-11-21
Payer: COMMERCIAL

## 2022-11-21 VITALS
HEART RATE: 60 BPM | BODY MASS INDEX: 27.78 KG/M2 | OXYGEN SATURATION: 98 % | SYSTOLIC BLOOD PRESSURE: 115 MMHG | HEIGHT: 68 IN | DIASTOLIC BLOOD PRESSURE: 80 MMHG | WEIGHT: 183.31 LBS | TEMPERATURE: 98 F | RESPIRATION RATE: 18 BRPM

## 2022-11-21 DIAGNOSIS — I42.8 NICM (NONISCHEMIC CARDIOMYOPATHY): ICD-10-CM

## 2022-11-21 DIAGNOSIS — E55.9 VITAMIN D DEFICIENCY: ICD-10-CM

## 2022-11-21 DIAGNOSIS — Z00.00 WELLNESS EXAMINATION: ICD-10-CM

## 2022-11-21 PROCEDURE — 3008F BODY MASS INDEX DOCD: CPT | Mod: CPTII,,, | Performed by: NURSE PRACTITIONER

## 2022-11-21 PROCEDURE — 1159F MED LIST DOCD IN RCRD: CPT | Mod: CPTII,,, | Performed by: NURSE PRACTITIONER

## 2022-11-21 PROCEDURE — 3074F SYST BP LT 130 MM HG: CPT | Mod: CPTII,,, | Performed by: NURSE PRACTITIONER

## 2022-11-21 PROCEDURE — 3074F PR MOST RECENT SYSTOLIC BLOOD PRESSURE < 130 MM HG: ICD-10-PCS | Mod: CPTII,,, | Performed by: NURSE PRACTITIONER

## 2022-11-21 PROCEDURE — 99396 PREV VISIT EST AGE 40-64: CPT | Mod: ,,, | Performed by: NURSE PRACTITIONER

## 2022-11-21 PROCEDURE — 3079F PR MOST RECENT DIASTOLIC BLOOD PRESSURE 80-89 MM HG: ICD-10-PCS | Mod: CPTII,,, | Performed by: NURSE PRACTITIONER

## 2022-11-21 PROCEDURE — 4010F PR ACE/ARB THEARPY RXD/TAKEN: ICD-10-PCS | Mod: CPTII,,, | Performed by: NURSE PRACTITIONER

## 2022-11-21 PROCEDURE — 1159F PR MEDICATION LIST DOCUMENTED IN MEDICAL RECORD: ICD-10-PCS | Mod: CPTII,,, | Performed by: NURSE PRACTITIONER

## 2022-11-21 PROCEDURE — 99396 PR PREVENTIVE VISIT,EST,40-64: ICD-10-PCS | Mod: ,,, | Performed by: NURSE PRACTITIONER

## 2022-11-21 PROCEDURE — 3079F DIAST BP 80-89 MM HG: CPT | Mod: CPTII,,, | Performed by: NURSE PRACTITIONER

## 2022-11-21 PROCEDURE — 3008F PR BODY MASS INDEX (BMI) DOCUMENTED: ICD-10-PCS | Mod: CPTII,,, | Performed by: NURSE PRACTITIONER

## 2022-11-21 PROCEDURE — 4010F ACE/ARB THERAPY RXD/TAKEN: CPT | Mod: CPTII,,, | Performed by: NURSE PRACTITIONER

## 2022-11-21 RX ORDER — APIXABAN 5 MG/1
5 TABLET, FILM COATED ORAL 2 TIMES DAILY
COMMUNITY
Start: 2022-11-02 | End: 2023-11-28

## 2022-11-21 RX ORDER — ACETAMINOPHEN 500 MG
2000 TABLET ORAL DAILY
Qty: 30 CAPSULE | Refills: 11 | Status: ON HOLD | OUTPATIENT
Start: 2022-11-21 | End: 2023-09-21 | Stop reason: HOSPADM

## 2022-11-21 RX ORDER — FUROSEMIDE 20 MG/1
40 TABLET ORAL DAILY
COMMUNITY
Start: 2022-07-28

## 2022-11-21 RX ORDER — SOTALOL HYDROCHLORIDE 120 MG/1
120 TABLET ORAL 2 TIMES DAILY
COMMUNITY
Start: 2022-11-02

## 2022-11-21 NOTE — PROGRESS NOTES
ANNUAL WELLNESS NOTE    Chief Complaint:  Annual Exam     HPI:  Clifton Davidson is a 52 y.o. male who presents to the clinic for a wellness exam lab review.  Patient denies any new concerns at this time.    ----------------------------  Chronic systolic heart failure  NICM (nonischemic cardiomyopathy)  Personal history of colonic polyps    Patient Care Team:  ERIK Jerome as PCP - General (Nurse Practitioner)    Nursing Assessments and Health Risk Assessment:  No flowsheet data found.  Fall Risk Assessment - Outpatient 11/21/2022   Mobility Status Ambulatory   Number of falls 0   Identified as fall risk 0     Review of Systems   Constitutional: Negative.    HENT: Negative.     Eyes: Negative.    Respiratory: Negative.     Cardiovascular: Negative.    Gastrointestinal: Negative.    Endocrine: Negative.    Genitourinary: Negative.    Musculoskeletal: Negative.    Integumentary:  Negative.   Allergic/Immunologic: Negative.    Neurological: Negative.    Hematological: Negative.    Psychiatric/Behavioral: Negative.       Physical Exam  Vitals and nursing note reviewed.   Constitutional:       Appearance: Normal appearance.   HENT:      Head: Normocephalic and atraumatic.      Right Ear: Ear canal and external ear normal.      Left Ear: Ear canal and external ear normal.      Nose: Nose normal.      Mouth/Throat:      Mouth: Mucous membranes are moist.      Pharynx: Oropharynx is clear.   Eyes:      Extraocular Movements: Extraocular movements intact.      Conjunctiva/sclera: Conjunctivae normal.      Pupils: Pupils are equal, round, and reactive to light.   Cardiovascular:      Rate and Rhythm: Normal rate and regular rhythm.      Pulses: Normal pulses.      Heart sounds: Normal heart sounds.   Pulmonary:      Effort: Pulmonary effort is normal.      Breath sounds: Normal breath sounds.   Abdominal:      General: Abdomen is flat. Bowel sounds are normal.      Palpations: Abdomen is soft.   Musculoskeletal:          General: Normal range of motion.      Cervical back: Normal range of motion and neck supple.   Skin:     General: Skin is warm and dry.      Capillary Refill: Capillary refill takes less than 2 seconds.   Neurological:      General: No focal deficit present.      Mental Status: He is alert and oriented to person, place, and time. Mental status is at baseline.   Psychiatric:         Mood and Affect: Mood normal.         Behavior: Behavior normal.         Thought Content: Thought content normal.         Judgment: Judgment normal.     Results for orders placed or performed in visit on 11/17/22   Comprehensive Metabolic Panel   Result Value Ref Range    Sodium Level 138 136 - 145 mmol/L    Potassium Level 3.9 3.5 - 5.1 mmol/L    Chloride 104 98 - 107 mmol/L    Carbon Dioxide 26 22 - 29 mmol/L    Glucose Level 97 74 - 100 mg/dL    Blood Urea Nitrogen 15.3 8.4 - 25.7 mg/dL    Creatinine 1.30 (H) 0.73 - 1.18 mg/dL    Calcium Level Total 9.4 8.4 - 10.2 mg/dL    Protein Total 6.5 6.4 - 8.3 gm/dL    Albumin Level 3.9 3.5 - 5.0 gm/dL    Globulin 2.6 2.4 - 3.5 gm/dL    Albumin/Globulin Ratio 1.5 1.1 - 2.0 ratio    Bilirubin Total 1.3 <=1.5 mg/dL    Alkaline Phosphatase 42 40 - 150 unit/L    Alanine Aminotransferase 13 0 - 55 unit/L    Aspartate Aminotransferase 18 5 - 34 unit/L    eGFR >60 mls/min/1.73/m2   Hemoglobin A1C   Result Value Ref Range    Hemoglobin A1c 5.3 <=7.0 %    Estimated Average Glucose 105.4 mg/dL   Lipid Panel   Result Value Ref Range    Cholesterol Total 153 <=200 mg/dL    HDL Cholesterol 37 35 - 60 mg/dL    Triglyceride 88 34 - 140 mg/dL    Cholesterol/HDL Ratio 4 0 - 5    Very Low Density Lipoprotein 18     LDL Cholesterol 98.00 50.00 - 140.00 mg/dL   Vitamin D   Result Value Ref Range    Vit D 25 OH 17.8 (L) 30.0 - 80.0 ng/mL   TSH   Result Value Ref Range    Thyroid Stimulating Hormone 1.4368 0.3500 - 4.9400 uIU/mL   Microalbumin/Creatinine Ratio, Urine   Result Value Ref Range    Urine Microalbumin  66.1 (H) <=30.0 ug/ml    Urine Creatinine 324.3 (H) 63.0 - 166.0 mg/dL    Microalbumin Creatinine Ratio 20.4 0.0 - 30.0 mg/gm Cr   PSA, Screening   Result Value Ref Range    Prostate Specific Antigen 0.88 <=4.00 ng/mL   CBC with Differential   Result Value Ref Range    WBC 4.3 (L) 4.5 - 11.5 x10(3)/mcL    RBC 5.45 4.70 - 6.10 x10(6)/mcL    Hgb 16.3 14.0 - 18.0 gm/dL    Hct 48.9 42.0 - 52.0 %    MCV 89.7 80.0 - 94.0 fL    MCH 29.9 27.0 - 31.0 pg    MCHC 33.3 33.0 - 36.0 mg/dL    RDW 15.5 11.5 - 17.0 %    Platelet 164 130 - 400 x10(3)/mcL    MPV 12.2 (H) 7.4 - 10.4 fL    Neut % 64.8 %    Lymph % 21.5 %    Mono % 10.0 %    Eos % 3.0 %    Basophil % 0.5 %    Lymph # 0.93 0.6 - 4.6 x10(3)/mcL    Neut # 2.8 2.1 - 9.2 x10(3)/mcL    Mono # 0.43 0.1 - 1.3 x10(3)/mcL    Eos # 0.13 0 - 0.9 x10(3)/mcL    Baso # 0.02 0 - 0.2 x10(3)/mcL    IG# 0.01 0 - 0.04 x10(3)/mcL    IG% 0.2 %    NRBC% 0.0 %         Health Maintenance:  Health Maintenance Topics with due status: Not Due       Topic Last Completion Date    Colorectal Cancer Screening 12/21/2021    Lipid Panel 11/17/2022     Health Maintenance Due   Topic Date Due    Hepatitis C Screening  Never done    HIV Screening  Never done    TETANUS VACCINE  Never done    Shingles Vaccine (1 of 2) Never done    COVID-19 Vaccine (3 - Booster for Migdalia series) 01/06/2022    Influenza Vaccine (1) 09/01/2022       Assessment/Plan:  1. Wellness examination  Assessment & Plan:  Healthy lifestyle discussed.  Colon cancer screening up-to-date.      2. NICM (nonischemic cardiomyopathy)  Assessment & Plan:  Stable.  Managed by Cardiology.      3. Vitamin D deficiency  Assessment & Plan:  Vitamin D3 2000 IU p.o. daily sent to pharmacy.      Other orders  -     cholecalciferol, vitamin D3, (VITAMIN D3) 50 mcg (2,000 unit) Cap capsule; Take 1 capsule (2,000 Units total) by mouth once daily.  Dispense: 30 capsule; Refill: 11        Return to clinic as needed and in 6 months for follow-up  appointment.

## 2023-01-09 ENCOUNTER — HOSPITAL ENCOUNTER (EMERGENCY)
Facility: HOSPITAL | Age: 53
Discharge: HOME | End: 2023-01-09
Attending: EMERGENCY MEDICINE
Payer: COMMERCIAL

## 2023-01-09 VITALS
BODY MASS INDEX: 26.52 KG/M2 | HEIGHT: 68 IN | DIASTOLIC BLOOD PRESSURE: 79 MMHG | SYSTOLIC BLOOD PRESSURE: 114 MMHG | TEMPERATURE: 99 F | HEART RATE: 72 BPM | OXYGEN SATURATION: 98 % | WEIGHT: 175 LBS | RESPIRATION RATE: 18 BRPM

## 2023-01-09 DIAGNOSIS — R05.9 COUGH: ICD-10-CM

## 2023-01-09 DIAGNOSIS — J06.9 VIRAL URI WITH COUGH: Primary | ICD-10-CM

## 2023-01-09 LAB
FLUAV AG UPPER RESP QL IA.RAPID: NOT DETECTED
FLUBV AG UPPER RESP QL IA.RAPID: NOT DETECTED
SARS-COV-2 RNA RESP QL NAA+PROBE: NOT DETECTED

## 2023-01-09 PROCEDURE — 99284 EMERGENCY DEPT VISIT MOD MDM: CPT | Mod: 25

## 2023-01-09 PROCEDURE — 0240U COVID/FLU A&B PCR: CPT | Performed by: NURSE PRACTITIONER

## 2023-01-09 RX ORDER — METHYLPREDNISOLONE 4 MG/1
TABLET ORAL
Qty: 1 EACH | Refills: 0 | Status: SHIPPED | OUTPATIENT
Start: 2023-01-09 | End: 2023-02-24

## 2023-01-09 RX ORDER — ALBUTEROL SULFATE 90 UG/1
2 AEROSOL, METERED RESPIRATORY (INHALATION) EVERY 6 HOURS PRN
Qty: 6.7 G | Refills: 2 | Status: SHIPPED | OUTPATIENT
Start: 2023-01-09 | End: 2023-11-28

## 2023-01-09 NOTE — DISCHARGE INSTRUCTIONS
Pyrilamine-chlophedianol as needed for cough/congestion  Albuterol inhaler 2 puffs every 6 hours as needed for wheezing  Medrol dosepack as directed  Alternate Tylenol and Motrin every 4 hours for pain/fever.

## 2023-01-09 NOTE — Clinical Note
"Clifton "Clifton" Lety was seen and treated in our emergency department on 1/9/2023.  He may return to work on 01/12/2023.       If you have any questions or concerns, please don't hesitate to call.      ERIK Hoover"

## 2023-01-09 NOTE — ED PROVIDER NOTES
Encounter Date: 1/9/2023       History     Chief Complaint   Patient presents with    Cough     Cough, congestion, fever and headache x 2-3 days      52-year-old male presents to ER complaining of cough, congestion, fever and headache with purulent mucus production that started on Friday.  He denies any shortness of breath.  He reports no vomiting or diarrhea.  Patient reports no known sick contacts.  States symptoms are worse at night when he lies down.    The history is provided by the patient. No  was used.   Review of patient's allergies indicates:   Allergen Reactions    Crab Swelling     Allergic to dungenous crab which causes lips to swell., no allergy to contrast media.     Past Medical History:   Diagnosis Date    Chronic systolic heart failure     NICM (nonischemic cardiomyopathy)     Personal history of colonic polyps 12/21/2021     Past Surgical History:   Procedure Laterality Date    CARDIAC BIOPSY      COLONOSCOPY  12/21/2021    INSERTION OF BIVENTRICULAR IMPLANTABLE CARDIOVERTER-DEFIBRILLATOR (ICD)      TRANSESOPHAGEAL ECHOCARDIOGRAPHY       Family History   Problem Relation Age of Onset    Valvular heart disease Mother     Coronary artery disease Brother         CABG     Social History     Tobacco Use    Smoking status: Never    Smokeless tobacco: Never   Substance Use Topics    Alcohol use: Never    Drug use: Never     Review of Systems   Constitutional:  Positive for fever.   HENT:  Positive for congestion. Negative for sore throat.    Respiratory:  Positive for cough. Negative for shortness of breath.    Gastrointestinal:  Negative for diarrhea and vomiting.   Musculoskeletal:  Positive for myalgias.   Neurological:  Positive for headaches. Negative for dizziness and light-headedness.   All other systems reviewed and are negative.    Physical Exam     Initial Vitals [01/09/23 1456]   BP Pulse Resp Temp SpO2   114/79 72 18 98.7 °F (37.1 °C) 98 %      MAP       --          Physical Exam    Constitutional: He appears well-developed and well-nourished.   HENT:   Head: Normocephalic.   Eyes: EOM are normal.   Neck: Neck supple.   Cardiovascular:  Normal rate, regular rhythm and normal heart sounds.           Pulmonary/Chest: Breath sounds normal. No respiratory distress.   Abdominal: He exhibits no distension.   Musculoskeletal:         General: Normal range of motion.      Cervical back: Neck supple.     Neurological: He is alert and oriented to person, place, and time.   Skin: Skin is warm and dry. Capillary refill takes less than 2 seconds.   Psychiatric: He has a normal mood and affect.       ED Course   Procedures  Labs Reviewed   COVID/FLU A&B PCR - Normal    Narrative:     The Xpert Xpress SARS-CoV-2/FLU/RSV plus is a rapid, multiplexed real-time PCR test intended for the simultaneous qualitative detection and differentiation of SARS-CoV-2, Influenza A, Influenza B, and respiratory syncytial virus (RSV) viral RNA in either nasopharyngeal swab or nasal swab specimens.                Imaging Results              X-Ray Chest PA And Lateral (Final result)  Result time 01/09/23 16:19:17      Final result by Asim Harmon MD (01/09/23 16:19:17)                   Impression:      No acute cardiopulmonary process identified.      Electronically signed by: Asim Harmon  Date:    01/09/2023  Time:    16:19               Narrative:    EXAMINATION:  XR CHEST PA AND LATERAL    CLINICAL HISTORY:  Cough, unspecified    TECHNIQUE:  Two-view    COMPARISON:  December 5, 2019.    FINDINGS:  Cardiopericardial silhouette is within normal limits.  Left chest implanted cardiac device electrodes terminate within the right atrium and the right ventricle.  Lungs are without dense focal or segmental consolidation, congestion, pleural effusion or pneumothorax.                                       Medications - No data to display  Medical Decision Making:   Differential Diagnosis:   COVID, influenza,  bronchitis, pneumonia  Clinical Tests:   Lab Tests: Ordered and Reviewed       <> Summary of Lab: COVID and influenza are negative  Radiological Study: Ordered and Reviewed  ED Management:  Negative COVID/influenza. Chest x-rays without infiltrate. Patient to be discharged home and treated for viral bronchitis. He is in no acute distress. Oxygen saturations 98% on room air.                         Clinical Impression:   Final diagnoses:  [R05.9] Cough  [J06.9] Viral URI with cough (Primary)        ED Disposition Condition    Discharge Stable          ED Prescriptions       Medication Sig Dispense Start Date End Date Auth. Provider    albuterol (PROVENTIL/VENTOLIN HFA) 90 mcg/actuation inhaler Inhale 2 puffs into the lungs every 6 (six) hours as needed for Wheezing or Shortness of Breath. 6.7 g 1/9/2023 1/9/2024 ERIK Hoover    pyrilamine-chlophedianoL 12.5-12.5 mg/5 mL Liqd Take 10 mLs by mouth every 8 (eight) hours as needed (cough). 180 mL 1/9/2023 -- ERIK Hoover    methylPREDNISolone (MEDROL DOSEPACK) 4 mg tablet As directed on package 1 each 1/9/2023 -- ERIK Hoover          Follow-up Information    None          ERIK Hoover  01/09/23 6772

## 2023-02-01 RX ORDER — LEVOCETIRIZINE DIHYDROCHLORIDE 5 MG/1
5 TABLET, FILM COATED ORAL NIGHTLY
Qty: 30 TABLET | Refills: 11 | Status: SHIPPED | OUTPATIENT
Start: 2023-02-01 | End: 2024-02-01

## 2023-02-02 ENCOUNTER — HOSPITAL ENCOUNTER (EMERGENCY)
Facility: HOSPITAL | Age: 53
Discharge: HOME | End: 2023-02-02
Attending: FAMILY MEDICINE
Payer: COMMERCIAL

## 2023-02-02 VITALS
OXYGEN SATURATION: 98 % | DIASTOLIC BLOOD PRESSURE: 72 MMHG | TEMPERATURE: 98 F | SYSTOLIC BLOOD PRESSURE: 128 MMHG | HEIGHT: 68 IN | HEART RATE: 80 BPM | BODY MASS INDEX: 26.83 KG/M2 | RESPIRATION RATE: 18 BRPM | WEIGHT: 177 LBS

## 2023-02-02 DIAGNOSIS — N30.90 CYSTITIS: Primary | ICD-10-CM

## 2023-02-02 LAB
APPEARANCE UR: ABNORMAL
BACTERIA #/AREA URNS AUTO: ABNORMAL /HPF
BILIRUB UR QL STRIP.AUTO: NEGATIVE MG/DL
COLOR UR AUTO: ABNORMAL
GLUCOSE UR QL STRIP.AUTO: NEGATIVE MG/DL
KETONES UR QL STRIP.AUTO: NEGATIVE MG/DL
LEUKOCYTE ESTERASE UR QL STRIP.AUTO: ABNORMAL UNIT/L
NITRITE UR QL STRIP.AUTO: NEGATIVE
PH UR STRIP.AUTO: 6.5 [PH]
PROT UR QL STRIP.AUTO: 100 MG/DL
RBC #/AREA URNS AUTO: ABNORMAL /HPF
RBC UR QL AUTO: ABNORMAL UNIT/L
SP GR UR STRIP.AUTO: 1.01
SQUAMOUS #/AREA URNS AUTO: ABNORMAL /HPF
UROBILINOGEN UR STRIP-ACNC: 0.2 MG/DL
WBC #/AREA URNS AUTO: ABNORMAL /HPF

## 2023-02-02 PROCEDURE — 25000003 PHARM REV CODE 250

## 2023-02-02 PROCEDURE — 99284 EMERGENCY DEPT VISIT MOD MDM: CPT | Mod: 25

## 2023-02-02 PROCEDURE — 81001 URINALYSIS AUTO W/SCOPE: CPT | Performed by: FAMILY MEDICINE

## 2023-02-02 PROCEDURE — 87088 URINE BACTERIA CULTURE: CPT | Performed by: FAMILY MEDICINE

## 2023-02-02 RX ORDER — NITROFURANTOIN 25; 75 MG/1; MG/1
100 CAPSULE ORAL 2 TIMES DAILY
Qty: 10 CAPSULE | Refills: 0 | Status: SHIPPED | OUTPATIENT
Start: 2023-02-02 | End: 2023-02-24 | Stop reason: ALTCHOICE

## 2023-02-02 RX ORDER — KETOROLAC TROMETHAMINE 10 MG/1
10 TABLET, FILM COATED ORAL
Status: COMPLETED | OUTPATIENT
Start: 2023-02-02 | End: 2023-02-02

## 2023-02-02 RX ADMIN — KETOROLAC TROMETHAMINE 10 MG: 10 TABLET, FILM COATED ORAL at 01:02

## 2023-02-02 NOTE — DISCHARGE INSTRUCTIONS
Patient encouraged to drink more water,taking antibiotics as prescribed. follow up with Urology for further evaluation

## 2023-02-02 NOTE — ED NOTES
To bed 2 for eval of mild burning upon urination and pt states he noticed alittle bit of blood in his urine, denies penile dc at this time.denies fever/chills

## 2023-02-02 NOTE — ED PROVIDER NOTES
Encounter Date: 2/2/2023       History     Chief Complaint   Patient presents with    Dysuria     Pt reports dysuria, hematuria, and frequency x2 days. Reports pain with urination. Denies n/v, fever, or chills.      Awake alert and oriented 52-year-old male presents to the ER with complaints of dysuria and hematuria.  Patient reports onset 2 days ago denies having any chronic bladder issues.    The history is provided by the patient. No  was used.   Review of patient's allergies indicates:   Allergen Reactions    Crab Swelling     Allergic to dungenous crab which causes lips to swell., no allergy to contrast media.     Past Medical History:   Diagnosis Date    Chronic systolic heart failure     NICM (nonischemic cardiomyopathy)     Personal history of colonic polyps 12/21/2021     Past Surgical History:   Procedure Laterality Date    CARDIAC BIOPSY      COLONOSCOPY  12/21/2021    INSERTION OF BIVENTRICULAR IMPLANTABLE CARDIOVERTER-DEFIBRILLATOR (ICD)      TRANSESOPHAGEAL ECHOCARDIOGRAPHY       Family History   Problem Relation Age of Onset    Valvular heart disease Mother     Coronary artery disease Brother         CABG     Social History     Tobacco Use    Smoking status: Never    Smokeless tobacco: Never   Substance Use Topics    Alcohol use: Never    Drug use: Never     Review of Systems   Constitutional: Negative.    HENT: Negative.     Eyes: Negative.    Respiratory: Negative.     Cardiovascular: Negative.    Gastrointestinal: Negative.    Endocrine: Negative.    Genitourinary:  Positive for dysuria and hematuria.   Musculoskeletal: Negative.    Skin: Negative.    Allergic/Immunologic: Negative.    Neurological: Negative.    Hematological: Negative.    Psychiatric/Behavioral: Negative.     All other systems reviewed and are negative.    Physical Exam     Initial Vitals [02/02/23 1313]   BP Pulse Resp Temp SpO2   120/83 70 20 97.9 °F (36.6 °C) 98 %      MAP       --         Physical  Exam    Constitutional: He appears well-developed and well-nourished.   HENT:   Head: Normocephalic and atraumatic.   Eyes: EOM are normal. Pupils are equal, round, and reactive to light.   Neck:   Normal range of motion.  Cardiovascular:  Normal rate, regular rhythm, normal heart sounds and intact distal pulses.           Pulmonary/Chest: Breath sounds normal.   Abdominal: Abdomen is soft. Bowel sounds are normal.   Genitourinary:    Genitourinary Comments: Pt reports dysuria, hematuria for 2 days.       Musculoskeletal:         General: Normal range of motion.      Cervical back: Normal range of motion.     Neurological: He is alert and oriented to person, place, and time. He has normal strength and normal reflexes. GCS score is 15. GCS eye subscore is 4. GCS verbal subscore is 5. GCS motor subscore is 6.   Skin: Skin is warm and dry. Capillary refill takes 2 to 3 seconds.   Psychiatric: He has a normal mood and affect.       ED Course   Procedures  Labs Reviewed   URINALYSIS, REFLEX TO URINE CULTURE - Abnormal; Notable for the following components:       Result Value    Appearance, UA Cloudy (*)     Protein,  (*)     Blood, UA Large (*)     Leukocyte Esterase, UA Large (*)     All other components within normal limits   URINALYSIS, MICROSCOPIC - Abnormal; Notable for the following components:    RBC, UA 11-20 (*)     WBC, UA 21-50 (*)     All other components within normal limits   CULTURE, URINE          Imaging Results              CT Renal Stone Study ABD Pelvis WO (Final result)  Result time 02/02/23 14:42:23      Final result by Vinh Painter MD (02/02/23 14:42:23)                   Impression:      Thickened urinary bladder wall with inflammatory changes seen around it consistent with cystitis    Simple cyst in the left kidney      Electronically signed by: Vinh Painter  Date:    02/02/2023  Time:    14:42               Narrative:    EXAMINATION:  CT RENAL STONE STUDY ABD PELVIS  WO    CLINICAL HISTORY:  Flank pain, kidney stone suspected;    TECHNIQUE:  Low dose axial images, sagittal and coronal reformations were obtained from the lung bases to the pubic symphysis.  No contrast was administered.  Automatic exposure control is utilized to reduce patient radiation exposure.    COMPARISON:  06/04/2018    FINDINGS:  The lung bases are clear.    The liver appears normal.  No obvious liver mass or lesion is seen.    Gallbladder appears normal.  No gallstones are seen.    The pancreas appears normal.  No inflammatory changes are seen in the pancreatic region.    The spleen appears normal and adrenal glands appear normal.  No adrenal nodule is seen.    No nephrolithiasis is seen.  No hydronephrosis is seen.  No hydroureter is seen.  No ureteral stone is seen.  There is a simple appearing cyst in the midpole of the left kidney.  It is stable since 2018    No colitis is seen.  No diverticulitis is seen.  No appendicitis is seen.    No free air seen.  No free fluid is seen.    The urinary bladder is decompressed but there are inflammatory changes seen around the urinary bladder and the bladder wall appears to be thickened.  Cystitis is suspected.    Bones show no acute abnormality.                                       Medications   ketorolac tablet 10 mg (10 mg Oral Given 2/2/23 4628)     Medical Decision Making:   Initial Assessment:   Awake alert ambulatory gait steady 52-year-old male presents to the ER with complaints of dysuria and hematuria.  Patient reports onset 2 days ago denies any history of kidney stone.  Pt denies any testicular pain or swelling.  No penile discharge.  Afebrile normal vital signs.  No history kidney stones, but reports frequent bladder infections.  Differential Diagnosis:   Cystitis  Prostatitis.  Clinical Tests:   Lab Tests: Ordered  Radiological Study: Ordered  ED Management:  Patient will be discharged home.  Encouraged to drink lots of water.  Macrobid prescription  provided for patient take twice a day once in the morning once an evening.  Patient is encouraged to follow up with Urology for further evaluation.                        Clinical Impression:   Final diagnoses:  [N30.90] Cystitis (Primary)        ED Disposition Condition    Discharge Stable          ED Prescriptions       Medication Sig Dispense Start Date End Date Auth. Provider    nitrofurantoin, macrocrystal-monohydrate, (MACROBID) 100 MG capsule Take 1 capsule (100 mg total) by mouth 2 (two) times daily. for 5 days 10 capsule 2/2/2023 2/7/2023 Yvette Nevarez NP          Follow-up Information    None          Yvette Nevarez NP  02/02/23 4814

## 2023-02-02 NOTE — ED NOTES
Pt dc nad and without complaints instructed importance of following up with a urologist of choice,

## 2023-02-04 LAB — BACTERIA UR CULT: NO GROWTH

## 2023-02-08 ENCOUNTER — OFFICE VISIT (OUTPATIENT)
Dept: FAMILY MEDICINE | Facility: CLINIC | Age: 53
End: 2023-02-08
Payer: COMMERCIAL

## 2023-02-08 VITALS
HEIGHT: 68 IN | RESPIRATION RATE: 18 BRPM | BODY MASS INDEX: 28.11 KG/M2 | OXYGEN SATURATION: 96 % | SYSTOLIC BLOOD PRESSURE: 112 MMHG | WEIGHT: 185.5 LBS | HEART RATE: 55 BPM | DIASTOLIC BLOOD PRESSURE: 75 MMHG | TEMPERATURE: 98 F

## 2023-02-08 DIAGNOSIS — N30.90 CYSTITIS: ICD-10-CM

## 2023-02-08 DIAGNOSIS — Z09 HOSPITAL DISCHARGE FOLLOW-UP: Primary | ICD-10-CM

## 2023-02-08 PROCEDURE — 1159F PR MEDICATION LIST DOCUMENTED IN MEDICAL RECORD: ICD-10-PCS | Mod: CPTII,,, | Performed by: NURSE PRACTITIONER

## 2023-02-08 PROCEDURE — 99213 OFFICE O/P EST LOW 20 MIN: CPT | Mod: ,,, | Performed by: NURSE PRACTITIONER

## 2023-02-08 PROCEDURE — 3008F PR BODY MASS INDEX (BMI) DOCUMENTED: ICD-10-PCS | Mod: CPTII,,, | Performed by: NURSE PRACTITIONER

## 2023-02-08 PROCEDURE — 3074F SYST BP LT 130 MM HG: CPT | Mod: CPTII,,, | Performed by: NURSE PRACTITIONER

## 2023-02-08 PROCEDURE — 4010F ACE/ARB THERAPY RXD/TAKEN: CPT | Mod: CPTII,,, | Performed by: NURSE PRACTITIONER

## 2023-02-08 PROCEDURE — 4010F PR ACE/ARB THEARPY RXD/TAKEN: ICD-10-PCS | Mod: CPTII,,, | Performed by: NURSE PRACTITIONER

## 2023-02-08 PROCEDURE — 3008F BODY MASS INDEX DOCD: CPT | Mod: CPTII,,, | Performed by: NURSE PRACTITIONER

## 2023-02-08 PROCEDURE — 3078F PR MOST RECENT DIASTOLIC BLOOD PRESSURE < 80 MM HG: ICD-10-PCS | Mod: CPTII,,, | Performed by: NURSE PRACTITIONER

## 2023-02-08 PROCEDURE — 99213 PR OFFICE/OUTPT VISIT, EST, LEVL III, 20-29 MIN: ICD-10-PCS | Mod: ,,, | Performed by: NURSE PRACTITIONER

## 2023-02-08 PROCEDURE — 1160F PR REVIEW ALL MEDS BY PRESCRIBER/CLIN PHARMACIST DOCUMENTED: ICD-10-PCS | Mod: CPTII,,, | Performed by: NURSE PRACTITIONER

## 2023-02-08 PROCEDURE — 1159F MED LIST DOCD IN RCRD: CPT | Mod: CPTII,,, | Performed by: NURSE PRACTITIONER

## 2023-02-08 PROCEDURE — 1160F RVW MEDS BY RX/DR IN RCRD: CPT | Mod: CPTII,,, | Performed by: NURSE PRACTITIONER

## 2023-02-08 PROCEDURE — 3074F PR MOST RECENT SYSTOLIC BLOOD PRESSURE < 130 MM HG: ICD-10-PCS | Mod: CPTII,,, | Performed by: NURSE PRACTITIONER

## 2023-02-08 PROCEDURE — 3078F DIAST BP <80 MM HG: CPT | Mod: CPTII,,, | Performed by: NURSE PRACTITIONER

## 2023-02-08 NOTE — PROGRESS NOTES
Patient ID: [id number]     Chief Complaint: Follow-up      HPI:     Clifton Davidson is a 52 y.o. male here today for a ER follow up.  Patient presented to [facility] ED on February 2nd with complaints of dysuria, hematuria and frequency.  Patient was diagnosed with cystitis per CT findings and initiated on Macrobid.  Patient has completed all doses and reports improvement in symptoms.    Past Medical History:  has a past medical history of Chronic systolic heart failure, NICM (nonischemic cardiomyopathy), and Personal history of colonic polyps.    Surgical History:  has a past surgical history that includes Colonoscopy (12/21/2021); Insertion of biventricular implantable cardioverter-defibrillator (ICD); Transesophageal echocardiography; and Cardiac biopsy.    Family History: family history includes Coronary artery disease in his brother; Valvular heart disease in his mother.    Social History:  reports that he has never smoked. He has never used smokeless tobacco. He reports that he does not drink alcohol and does not use drugs.    Current Outpatient Medications   Medication Instructions    albuterol (PROVENTIL/VENTOLIN HFA) 90 mcg/actuation inhaler 2 puffs, Inhalation, Every 6 hours PRN    cholecalciferol (vitamin D3) (VITAMIN D3) 2,000 Units, Oral, Daily    ELIQUIS 5 mg, Oral, 2 times daily    ENTRESTO  mg per tablet 1 tablet, Oral, 2 times daily    furosemide (LASIX) 40 mg, Oral, Daily    levocetirizine (XYZAL) 5 mg, Oral, Nightly    sotaloL (BETAPACE) 80 mg, Oral, 2 times daily       Patient is allergic to crab.     Patient Care Team:  ERIK Jerome as PCP - General (Nurse Practitioner)       Subjective:     Review of Systems    12 point review of systems conducted, negative except as stated in the history of present illness. See HPI for details.      Objective:     Visit Vitals  /75 (BP Location: Left arm, Patient Position: Sitting)   Pulse (!) 55   Temp 97.9 °F (36.6 °C) (Oral)   Resp 18   Ht 5'  "8" (1.727 m)   Wt 84.1 kg (185 lb 8 oz)   SpO2 96%   BMI 28.21 kg/m²       Physical Exam    General: Alert and oriented, No acute distress.  Head: Normocephalic, Atraumatic.  Eye: Pupils are equal, round and reactive to light, Extraocular movements are intact, Sclera non-icteric.  Ears/Nose/Throat: Normal, Mucosa moist,Clear.  Neck/Thyroid: Supple, Non-tender, No carotid bruit, No lymphadenopathy, No JVD, Full range of motion.  Respiratory: Clear to auscultation bilaterally; No wheezes, rales or rhonchi,Non-labored respirations, Symmetrical chest wall expansion.  Cardiovascular: Regular rate and rhythm, S1/S2 normal, No murmurs, rubs or gallops.  Gastrointestinal: Soft, Non-tender, Non-distended, Normal bowel sounds, No palpable organomegaly.  Musculoskeletal: Normal range of motion.  Integumentary: Warm, Dry, Intact, No suspicious lesions or rashes.  Extremities: No clubbing, cyanosis or edema  Neurologic: No focal deficits, Cranial Nerves II-XII are grossly intact, Motor strength normal upper and lower extremities, Sensory exam intact.  Psychiatric: Normal interaction, Coherent speech, Euthymic mood, Appropriate affect     Labs Reviewed:     Chemistry:  Lab Results   Component Value Date     11/17/2022    K 3.9 11/17/2022    CHLORIDE 104 11/17/2022    BUN 15.3 11/17/2022    CREATININE 1.30 (H) 11/17/2022    EGFRNORACEVR >60 11/17/2022    GLUCOSE 97 11/17/2022    CALCIUM 9.4 11/17/2022    ALKPHOS 42 11/17/2022    LABPROT 6.5 11/17/2022    ALBUMIN 3.9 11/17/2022    BILIDIR 0.4 04/07/2022    IBILI 0.80 04/07/2022    AST 18 11/17/2022    ALT 13 11/17/2022    MG 2.2 07/27/2017    PHOS 2.8 07/27/2017    [id number] 17.8 (L) 11/17/2022    TSH 1.4368 11/17/2022    PSA 0.88 11/17/2022        Lab Results   Component Value Date    HGBA1C 5.3 11/17/2022        Hematology:  Lab Results   Component Value Date    WBC 4.3 (L) 11/17/2022    HGB 16.3 11/17/2022    HCT 48.9 11/17/2022     11/17/2022       Lipid " Panel:  Lab Results   Component Value Date    CHOL 153 11/17/2022    HDL 37 11/17/2022    LDL 98.00 11/17/2022    TRIG 88 11/17/2022    TOTALCHOLEST 4 11/17/2022        Urine:  Lab Results   Component Value Date    COLORUA Light-Yellow 02/02/2023    APPEARANCEUA Cloudy (A) 02/02/2023    SGUA 1.010 02/02/2023    PHUA 6.5 02/02/2023    PROTEINUA 100 (A) 02/02/2023    GLUCOSEUA Negative 02/02/2023    KETONESUA Negative 02/02/2023    BLOODUA Large (A) 02/02/2023    NITRITESUA Negative 02/02/2023    LEUKOCYTESUR Large (A) 02/02/2023    RBCUA 11-20 (A) 02/02/2023    WBCUA 21-50 (A) 02/02/2023    BACTERIA Rare 02/02/2023    CREATRANDUR 324.3 (H) 11/17/2022    PROTEINURINE <5.0 07/27/2017          Assessment:       ICD-10-CM ICD-9-CM   1. Hospital discharge follow-up  Z09 V67.59   2. Cystitis  N30.90 595.9        Plan:   1. Hospital discharge follow-up  Symptoms resolved.  ED precautions.    2. Cystitis  - Ambulatory referral/consult to Urology; Future       Follow up if symptoms worsen or fail to improve. In addition to their scheduled follow up, the patient has also been instructed to follow up on as needed basis.     Future Appointments   Date Time Provider Department Center   2/28/2023  9:20 AM ERIK Jerome [facility]        ERIK Jerome

## 2023-02-20 PROBLEM — Z00.00 WELLNESS EXAMINATION: Status: RESOLVED | Noted: 2022-11-21 | Resolved: 2023-02-20

## 2023-02-24 PROBLEM — Z09 HOSPITAL DISCHARGE FOLLOW-UP: Status: ACTIVE | Noted: 2023-02-24

## 2023-03-07 ENCOUNTER — HOSPITAL ENCOUNTER (EMERGENCY)
Facility: HOSPITAL | Age: 53
Discharge: HOME | End: 2023-03-07
Attending: FAMILY MEDICINE
Payer: COMMERCIAL

## 2023-03-07 VITALS
RESPIRATION RATE: 16 BRPM | HEART RATE: 76 BPM | TEMPERATURE: 98 F | SYSTOLIC BLOOD PRESSURE: 132 MMHG | DIASTOLIC BLOOD PRESSURE: 90 MMHG | OXYGEN SATURATION: 99 %

## 2023-03-07 DIAGNOSIS — R52 PAIN AGGRAVATED BY LIFTING: ICD-10-CM

## 2023-03-07 DIAGNOSIS — M25.511 ACUTE PAIN OF RIGHT SHOULDER: Primary | ICD-10-CM

## 2023-03-07 PROCEDURE — 99284 EMERGENCY DEPT VISIT MOD MDM: CPT

## 2023-03-07 PROCEDURE — 96372 THER/PROPH/DIAG INJ SC/IM: CPT | Performed by: NURSE PRACTITIONER

## 2023-03-07 PROCEDURE — 63600175 PHARM REV CODE 636 W HCPCS: Performed by: NURSE PRACTITIONER

## 2023-03-07 RX ORDER — HYDROCODONE BITARTRATE AND ACETAMINOPHEN 5; 325 MG/1; MG/1
1 TABLET ORAL EVERY 6 HOURS PRN
Qty: 12 TABLET | Refills: 0 | Status: SHIPPED | OUTPATIENT
Start: 2023-03-07 | End: 2023-03-27 | Stop reason: ALTCHOICE

## 2023-03-07 RX ORDER — DEXAMETHASONE SODIUM PHOSPHATE 4 MG/ML
8 INJECTION, SOLUTION INTRA-ARTICULAR; INTRALESIONAL; INTRAMUSCULAR; INTRAVENOUS; SOFT TISSUE
Status: COMPLETED | OUTPATIENT
Start: 2023-03-07 | End: 2023-03-07

## 2023-03-07 RX ADMIN — DEXAMETHASONE SODIUM PHOSPHATE 8 MG: 4 INJECTION, SOLUTION INTRA-ARTICULAR; INTRALESIONAL; INTRAMUSCULAR; INTRAVENOUS; SOFT TISSUE at 12:03

## 2023-03-07 NOTE — Clinical Note
"Clifton "Clifton" Lety was seen and treated in our emergency department on 3/7/2023.  He may return to work on 03/11/2023.       If you have any questions or concerns, please don't hesitate to call.      ERIK Hoover"

## 2023-03-07 NOTE — DISCHARGE INSTRUCTIONS
Ice  Avoid repetitive motion, lifting, pulling with right shoulder  Hydrocodone as needed for pain, do not take and work/drive

## 2023-03-27 ENCOUNTER — OFFICE VISIT (OUTPATIENT)
Dept: FAMILY MEDICINE | Facility: CLINIC | Age: 53
End: 2023-03-27
Payer: COMMERCIAL

## 2023-03-27 VITALS
SYSTOLIC BLOOD PRESSURE: 110 MMHG | DIASTOLIC BLOOD PRESSURE: 73 MMHG | OXYGEN SATURATION: 97 % | TEMPERATURE: 98 F | HEIGHT: 68 IN | WEIGHT: 182.13 LBS | BODY MASS INDEX: 27.6 KG/M2 | HEART RATE: 65 BPM | RESPIRATION RATE: 18 BRPM

## 2023-03-27 DIAGNOSIS — Z00.00 WELL ADULT EXAM: ICD-10-CM

## 2023-03-27 DIAGNOSIS — L70.0 ACNE VULGARIS: ICD-10-CM

## 2023-03-27 DIAGNOSIS — I42.8 NICM (NONISCHEMIC CARDIOMYOPATHY): Primary | ICD-10-CM

## 2023-03-27 PROBLEM — L70.9 ACNE: Status: ACTIVE | Noted: 2023-03-27

## 2023-03-27 PROCEDURE — 1160F RVW MEDS BY RX/DR IN RCRD: CPT | Mod: CPTII,,, | Performed by: NURSE PRACTITIONER

## 2023-03-27 PROCEDURE — 3008F PR BODY MASS INDEX (BMI) DOCUMENTED: ICD-10-PCS | Mod: CPTII,,, | Performed by: NURSE PRACTITIONER

## 2023-03-27 PROCEDURE — 3074F SYST BP LT 130 MM HG: CPT | Mod: CPTII,,, | Performed by: NURSE PRACTITIONER

## 2023-03-27 PROCEDURE — 3078F DIAST BP <80 MM HG: CPT | Mod: CPTII,,, | Performed by: NURSE PRACTITIONER

## 2023-03-27 PROCEDURE — 1159F PR MEDICATION LIST DOCUMENTED IN MEDICAL RECORD: ICD-10-PCS | Mod: CPTII,,, | Performed by: NURSE PRACTITIONER

## 2023-03-27 PROCEDURE — 1160F PR REVIEW ALL MEDS BY PRESCRIBER/CLIN PHARMACIST DOCUMENTED: ICD-10-PCS | Mod: CPTII,,, | Performed by: NURSE PRACTITIONER

## 2023-03-27 PROCEDURE — 3008F BODY MASS INDEX DOCD: CPT | Mod: CPTII,,, | Performed by: NURSE PRACTITIONER

## 2023-03-27 PROCEDURE — 3074F PR MOST RECENT SYSTOLIC BLOOD PRESSURE < 130 MM HG: ICD-10-PCS | Mod: CPTII,,, | Performed by: NURSE PRACTITIONER

## 2023-03-27 PROCEDURE — 4010F ACE/ARB THERAPY RXD/TAKEN: CPT | Mod: CPTII,,, | Performed by: NURSE PRACTITIONER

## 2023-03-27 PROCEDURE — 99213 OFFICE O/P EST LOW 20 MIN: CPT | Mod: ,,, | Performed by: NURSE PRACTITIONER

## 2023-03-27 PROCEDURE — 99213 PR OFFICE/OUTPT VISIT, EST, LEVL III, 20-29 MIN: ICD-10-PCS | Mod: ,,, | Performed by: NURSE PRACTITIONER

## 2023-03-27 PROCEDURE — 1159F MED LIST DOCD IN RCRD: CPT | Mod: CPTII,,, | Performed by: NURSE PRACTITIONER

## 2023-03-27 PROCEDURE — 3078F PR MOST RECENT DIASTOLIC BLOOD PRESSURE < 80 MM HG: ICD-10-PCS | Mod: CPTII,,, | Performed by: NURSE PRACTITIONER

## 2023-03-27 PROCEDURE — 4010F PR ACE/ARB THEARPY RXD/TAKEN: ICD-10-PCS | Mod: CPTII,,, | Performed by: NURSE PRACTITIONER

## 2023-03-27 RX ORDER — TAZAROTENE 1 MG/G
GEL TOPICAL NIGHTLY
Qty: 30 G | Refills: 0 | Status: ON HOLD | OUTPATIENT
Start: 2023-03-27 | End: 2023-09-21 | Stop reason: HOSPADM

## 2023-03-27 NOTE — PROGRESS NOTES
"   Patient ID: [id number]     Chief Complaint: Hypertension (6 mo fu)      HPI:     Clifton Davidson is a 52 y.o. male here today for a 6 month follow up. No other complaints today.       Past Medical History:  has a past medical history of Chronic systolic heart failure, NICM (nonischemic cardiomyopathy), and Personal history of colonic polyps.    Surgical History:  has a past surgical history that includes Colonoscopy (12/21/2021); Insertion of biventricular implantable cardioverter-defibrillator (ICD); Transesophageal echocardiography; and Cardiac biopsy.    Family History: family history includes Coronary artery disease in his brother; Valvular heart disease in his mother.    Social History:  reports that he has never smoked. He has never used smokeless tobacco. He reports that he does not drink alcohol and does not use drugs.    Current Outpatient Medications   Medication Instructions    albuterol (PROVENTIL/VENTOLIN HFA) 90 mcg/actuation inhaler 2 puffs, Inhalation, Every 6 hours PRN    cholecalciferol (vitamin D3) (VITAMIN D3) 2,000 Units, Oral, Daily    ELIQUIS 5 mg, Oral, 2 times daily    ENTRESTO  mg per tablet 1 tablet, Oral, 2 times daily    furosemide (LASIX) 40 mg, Oral, Daily    levocetirizine (XYZAL) 5 mg, Oral, Nightly    sotaloL (BETAPACE) 80 mg, Oral, 2 times daily    tazarotene (TAZORAC) 0.1 % Gel gel Topical (Top), Nightly       Patient is allergic to crab.     Patient Care Team:  ERIK Jerome as PCP - General (Nurse Practitioner)       Subjective:     Review of Systems    12 point review of systems conducted, negative except as stated in the history of present illness. See HPI for details.      Objective:     Visit Vitals  /73 (BP Location: Left arm, Patient Position: Sitting)   Pulse 65   Temp 98.2 °F (36.8 °C)   Resp 18   Ht 5' 8" (1.727 m)   Wt 82.6 kg (182 lb 1.6 oz)   SpO2 97%   BMI 27.69 kg/m²       Physical Exam    General: Alert and oriented, No acute distress.  Head: " Normocephalic, Atraumatic.  Eye: Pupils are equal, round and reactive to light, Extraocular movements are intact, Sclera non-icteric.  Ears/Nose/Throat: Normal, Mucosa moist,Clear.  Neck/Thyroid: Supple, Non-tender, No carotid bruit, No lymphadenopathy, No JVD, Full range of motion.  Respiratory: Clear to auscultation bilaterally; No wheezes, rales or rhonchi,Non-labored respirations, Symmetrical chest wall expansion.  Cardiovascular: Regular rate and rhythm, S1/S2 normal, No murmurs, rubs or gallops.  Gastrointestinal: Soft, Non-tender, Non-distended, Normal bowel sounds, No palpable organomegaly.  Musculoskeletal: Normal range of motion.  Integumentary: Acne to face, Warm, Dry, Intact, No suspicious lesions or rashes.  Extremities: No clubbing, cyanosis or edema  Neurologic: No focal deficits, Cranial Nerves II-XII are grossly intact, Motor strength normal upper and lower extremities, Sensory exam intact.  Psychiatric: Normal interaction, Coherent speech, Euthymic mood, Appropriate affect     Labs Reviewed:     Chemistry:  Lab Results   Component Value Date     11/17/2022    K 3.9 11/17/2022    CHLORIDE 104 11/17/2022    BUN 15.3 11/17/2022    CREATININE 1.30 (H) 11/17/2022    EGFRNORACEVR >60 11/17/2022    GLUCOSE 97 11/17/2022    CALCIUM 9.4 11/17/2022    ALKPHOS 42 11/17/2022    LABPROT 6.5 11/17/2022    ALBUMIN 3.9 11/17/2022    BILIDIR 0.4 04/07/2022    IBILI 0.80 04/07/2022    AST 18 11/17/2022    ALT 13 11/17/2022    MG 2.2 07/27/2017    PHOS 2.8 07/27/2017    [id number] 17.8 (L) 11/17/2022    TSH 1.4368 11/17/2022    PSA 0.88 11/17/2022        Lab Results   Component Value Date    HGBA1C 5.3 11/17/2022        Hematology:  Lab Results   Component Value Date    WBC 4.3 (L) 11/17/2022    HGB 16.3 11/17/2022    HCT 48.9 11/17/2022     11/17/2022       Lipid Panel:  Lab Results   Component Value Date    CHOL 153 11/17/2022    HDL 37 11/17/2022    LDL 98.00 11/17/2022    TRIG 88 11/17/2022     TOTALCHOLEST 4 11/17/2022        Urine:  Lab Results   Component Value Date    COLORUA Light-Yellow 02/02/2023    APPEARANCEUA Cloudy (A) 02/02/2023    SGUA 1.010 02/02/2023    PHUA 6.5 02/02/2023    PROTEINUA 100 (A) 02/02/2023    GLUCOSEUA Negative 02/02/2023    KETONESUA Negative 02/02/2023    BLOODUA Large (A) 02/02/2023    NITRITESUA Negative 02/02/2023    LEUKOCYTESUR Large (A) 02/02/2023    RBCUA 11-20 (A) 02/02/2023    WBCUA 21-50 (A) 02/02/2023    BACTERIA Rare 02/02/2023    CREATRANDUR 324.3 (H) 11/17/2022    PROTEINURINE <5.0 07/27/2017          Assessment:       ICD-10-CM ICD-9-CM   1. NICM (nonischemic cardiomyopathy)  I42.8 425.4   2. Acne vulgaris  L70.0 706.1   3. Well adult exam  Z00.00 V70.0        Plan:     1. NICM (nonischemic cardiomyopathy)  Stable.  Keep scheduled follow-up appointment with Cardiology this week.  ED precautions.    2. Acne vulgaris  - tazarotene (TAZORAC) 0.1 % Gel gel; Apply topically nightly.  Dispense: 30 g; Refill: 0    3. Well adult exam  - CBC Auto Differential; Future  - Comprehensive Metabolic Panel; Future  - Lipid Panel; Future  - TSH; Future  - Hemoglobin A1C; Future  - Urinalysis; Future  - Vitamin D; Future  - PSA, Screening; Future  - Microalbumin/Creatinine Ratio, Urine; Future  - Urinalysis      Follow up in about 8 months (around 11/27/2023) for Wellness. In addition to their scheduled follow up, the patient has also been instructed to follow up on as needed basis.     Future Appointments   Date Time Provider Department Center   11/28/2023  1:20 PM ERIK Jerome [facility]        ERIK Jerome

## 2023-03-30 ENCOUNTER — LAB VISIT (OUTPATIENT)
Dept: LAB | Facility: HOSPITAL | Age: 53
End: 2023-03-30
Attending: INTERNAL MEDICINE
Payer: COMMERCIAL

## 2023-03-30 DIAGNOSIS — I42.8 OBSCURE CARDIOMYOPATHY OF AFRICA: Primary | ICD-10-CM

## 2023-03-30 DIAGNOSIS — I50.22 CHRONIC SYSTOLIC HEART FAILURE: ICD-10-CM

## 2023-03-30 LAB
ANION GAP SERPL CALC-SCNC: 7 MEQ/L
BUN SERPL-MCNC: 13.5 MG/DL (ref 8.4–25.7)
CALCIUM SERPL-MCNC: 9.4 MG/DL (ref 8.4–10.2)
CHLORIDE SERPL-SCNC: 105 MMOL/L (ref 98–107)
CO2 SERPL-SCNC: 26 MMOL/L (ref 22–29)
CREAT SERPL-MCNC: 1.15 MG/DL (ref 0.73–1.18)
CREAT/UREA NIT SERPL: 12
GFR SERPLBLD CREATININE-BSD FMLA CKD-EPI: >60 MLS/MIN/1.73/M2
GLUCOSE SERPL-MCNC: 107 MG/DL (ref 74–100)
POTASSIUM SERPL-SCNC: 4.7 MMOL/L (ref 3.5–5.1)
SODIUM SERPL-SCNC: 138 MMOL/L (ref 136–145)

## 2023-03-30 PROCEDURE — 80048 BASIC METABOLIC PNL TOTAL CA: CPT

## 2023-03-30 PROCEDURE — 36415 COLL VENOUS BLD VENIPUNCTURE: CPT

## 2023-04-04 ENCOUNTER — TELEPHONE (OUTPATIENT)
Dept: FAMILY MEDICINE | Facility: CLINIC | Age: 53
End: 2023-04-04
Payer: COMMERCIAL

## 2023-04-04 NOTE — TELEPHONE ENCOUNTER
Pt requesting referral be sent to Dr. Jay Rivera MD Urology clinic [place], [place] · [phone], fax [phone]. Done.    ----- Message from April Stewart sent at 4/4/2023 10:49 AM CDT -----  Regarding: referral advice  .Type:  Needs Medical Advice    Who Called: patient  Symptoms (please be specific):    How long has patient had these symptoms:    Pharmacy name and phone #:    Would the patient rather a call back or a response via [facility]? Call back  Best Call Back Number: [phone]  Additional Information: patient would like to be contacted as to why his referral for urology is based in [place] and not [place].

## 2023-04-19 ENCOUNTER — HOSPITAL ENCOUNTER (EMERGENCY)
Facility: HOSPITAL | Age: 53
Discharge: HOME | End: 2023-04-19
Attending: FAMILY MEDICINE
Payer: COMMERCIAL

## 2023-04-19 DIAGNOSIS — J40 BRONCHITIS: Primary | ICD-10-CM

## 2023-04-19 LAB
FLUAV AG UPPER RESP QL IA.RAPID: NOT DETECTED
FLUBV AG UPPER RESP QL IA.RAPID: NOT DETECTED
RSV A 5' UTR RNA NPH QL NAA+PROBE: NOT DETECTED
SARS-COV-2 RNA RESP QL NAA+PROBE: NOT DETECTED
STREP A PCR (OHS): NOT DETECTED

## 2023-04-19 PROCEDURE — 63600175 PHARM REV CODE 636 W HCPCS: Performed by: FAMILY MEDICINE

## 2023-04-19 PROCEDURE — 99284 EMERGENCY DEPT VISIT MOD MDM: CPT | Mod: 25

## 2023-04-19 PROCEDURE — 87651 STREP A DNA AMP PROBE: CPT | Performed by: FAMILY MEDICINE

## 2023-04-19 PROCEDURE — 96372 THER/PROPH/DIAG INJ SC/IM: CPT | Performed by: FAMILY MEDICINE

## 2023-04-19 PROCEDURE — 0241U COVID/RSV/FLU A&B PCR: CPT | Performed by: FAMILY MEDICINE

## 2023-04-19 RX ORDER — AZITHROMYCIN 250 MG/1
250 TABLET, FILM COATED ORAL DAILY
Qty: 6 TABLET | Refills: 0 | Status: ON HOLD | OUTPATIENT
Start: 2023-04-19 | End: 2023-09-21 | Stop reason: HOSPADM

## 2023-04-19 RX ORDER — DEXAMETHASONE SODIUM PHOSPHATE 4 MG/ML
8 INJECTION, SOLUTION INTRA-ARTICULAR; INTRALESIONAL; INTRAMUSCULAR; INTRAVENOUS; SOFT TISSUE
Status: COMPLETED | OUTPATIENT
Start: 2023-04-19 | End: 2023-04-19

## 2023-04-19 RX ADMIN — DEXAMETHASONE SODIUM PHOSPHATE 8 MG: 4 INJECTION, SOLUTION INTRA-ARTICULAR; INTRALESIONAL; INTRAMUSCULAR; INTRAVENOUS; SOFT TISSUE at 11:04

## 2023-04-20 VITALS
WEIGHT: 177 LBS | OXYGEN SATURATION: 98 % | HEART RATE: 65 BPM | HEIGHT: 66 IN | SYSTOLIC BLOOD PRESSURE: 125 MMHG | TEMPERATURE: 99 F | DIASTOLIC BLOOD PRESSURE: 87 MMHG | BODY MASS INDEX: 28.45 KG/M2 | RESPIRATION RATE: 16 BRPM

## 2023-04-20 NOTE — ED PROVIDER NOTES
Encounter Date: 4/19/2023       History     Chief Complaint   Patient presents with    Cough     Cough, fever  x 5 days      Cough   52-year-old male patient had cough and fever for 5 days no chills no night sweats no known sick exposures no other complaints patient has chronic condition of chronic systolic heart failure that may be contributing to his car for all no orthopnea no dyspnea on exertion.      Review of patient's allergies indicates:   Allergen Reactions    Crab Swelling     Allergic to dungenous crab which causes lips to swell., no allergy to contrast media.     Past Medical History:   Diagnosis Date    Chronic systolic heart failure     NICM (nonischemic cardiomyopathy)     Personal history of colonic polyps 12/21/2021     Past Surgical History:   Procedure Laterality Date    CARDIAC BIOPSY      COLONOSCOPY  12/21/2021    INSERTION OF BIVENTRICULAR IMPLANTABLE CARDIOVERTER-DEFIBRILLATOR (ICD)      TRANSESOPHAGEAL ECHOCARDIOGRAPHY       Family History   Problem Relation Age of Onset    Valvular heart disease Mother     Coronary artery disease Brother         CABG     Social History     Tobacco Use    Smoking status: Never    Smokeless tobacco: Never   Substance Use Topics    Alcohol use: Never    Drug use: Never     Review of Systems   Constitutional:  Negative for fever.   HENT:  Negative for sore throat.    Respiratory:  Positive for cough. Negative for shortness of breath.    Cardiovascular:  Negative for chest pain.   Gastrointestinal:  Negative for nausea.   Genitourinary:  Negative for dysuria.   Musculoskeletal:  Negative for back pain.   Skin:  Negative for rash.   Neurological:  Negative for weakness.   Hematological:  Does not bruise/bleed easily.   All other systems reviewed and are negative.    Physical Exam     Initial Vitals [04/19/23 1903]   BP Pulse Resp Temp SpO2   120/85 72 18 98.4 °F (36.9 °C) 98 %      MAP       --         Physical Exam    Nursing note and vitals  reviewed.  Constitutional: He appears well-developed and well-nourished. He is not diaphoretic. No distress.   HENT:   Head: Normocephalic and atraumatic.   Right Ear: External ear normal.   Left Ear: External ear normal.   Nose: Nose normal.   Mouth/Throat: Oropharynx is clear and moist. No oropharyngeal exudate.   Eyes: Conjunctivae and EOM are normal. Pupils are equal, round, and reactive to light. Right eye exhibits no discharge. Left eye exhibits no discharge.   Neck: Neck supple. No thyromegaly present. No tracheal deviation present. No JVD present.   Normal range of motion.  Cardiovascular:  Normal rate, regular rhythm, normal heart sounds and intact distal pulses.     Exam reveals no gallop and no friction rub.       No murmur heard.  Pulmonary/Chest: Breath sounds normal. No stridor. No respiratory distress. He has no wheezes. He has no rhonchi. He has no rales. He exhibits no tenderness.   Abdominal: Abdomen is soft. Bowel sounds are normal. He exhibits no distension. There is no abdominal tenderness. There is no rebound and no guarding.   Musculoskeletal:         General: No tenderness or edema. Normal range of motion.      Cervical back: Normal range of motion and neck supple.     Lymphadenopathy:     He has no cervical adenopathy.   Neurological: He is alert and oriented to person, place, and time. He has normal strength and normal reflexes. No cranial nerve deficit or sensory deficit. GCS score is 15. GCS eye subscore is 4. GCS verbal subscore is 5. GCS motor subscore is 6.   Skin: Skin is warm and dry. No rash and no abscess noted. No erythema.   Psychiatric: He has a normal mood and affect. His behavior is normal. Judgment and thought content normal.       ED Course   Procedures  Labs Reviewed   COVID/RSV/FLU A&B PCR - Normal    Narrative:     The Xpert Xpress SARS-CoV-2/FLU/RSV plus is a rapid, multiplexed real-time PCR test intended for the simultaneous qualitative detection and differentiation of  SARS-CoV-2, Influenza A, Influenza B, and respiratory syncytial virus (RSV) viral RNA in either nasopharyngeal swab or nasal swab specimens.         STREP GROUP A BY PCR - Normal    Narrative:     The Xpert Xpress Strep A test is a rapid, qualitative in vitro diagnostic test for the detection of Streptococcus pyogenes (Group A ß-hemolytic Streptococcus, Strep A) in throat swab specimens from patients with signs and symptoms of pharyngitis.            Imaging Results              X-Ray Chest AP Portable (Final result)  Result time 04/19/23 22:01:57      Final result by Jay Castillo MD (04/19/23 22:01:57)                   Impression:      No acute cardiopulmonary process.      Electronically signed by: Jay Castillo  Date:    04/19/2023  Time:    22:01               Narrative:    EXAMINATION:  XR CHEST AP PORTABLE    CLINICAL HISTORY:  cough;    TECHNIQUE:  Single view of the chest    COMPARISON:  01/09/2023    FINDINGS:  No focal opacification, pleural effusion, or pneumothorax.    The cardiomediastinal silhouette is within normal limits.    No acute osseous abnormality.                                    X-Rays:   Independently Interpreted Readings:   Other Readings:    Negative for any acute process please see radiology report  Medications   dexAMETHasone injection 8 mg (has no administration in time range)     Medical Decision Making:   Differential Diagnosis:    COVID flu congestion bronchitis pneumonia                        Clinical Impression:   Final diagnoses:  [J40] Bronchitis (Primary)        ED Disposition Condition    Discharge Stable          ED Prescriptions       Medication Sig Dispense Start Date End Date Auth. Provider    azithromycin (Z-SADE) 250 MG tablet Take 1 tablet (250 mg total) by mouth once daily. Take first 2 tablets together, then 1 every day until finished. 6 tablet 4/19/2023 -- Abraham Alamo MD          Follow-up Information    None          Abraham Alamo MD  04/19/23  [id number]

## 2023-05-08 DIAGNOSIS — I42.8 NONISCHEMIC CARDIOMYOPATHY: Primary | ICD-10-CM

## 2023-05-12 ENCOUNTER — HOSPITAL ENCOUNTER (OUTPATIENT)
Dept: RADIOLOGY | Facility: HOSPITAL | Age: 53
Discharge: HOME | End: 2023-05-12
Attending: INTERNAL MEDICINE
Payer: COMMERCIAL

## 2023-05-12 DIAGNOSIS — I42.8 NONISCHEMIC CARDIOMYOPATHY: ICD-10-CM

## 2023-05-12 PROCEDURE — 78472 GATED HEART PLANAR SINGLE: CPT | Mod: TC

## 2023-05-25 DIAGNOSIS — R31.9 HEMATURIA SYNDROME: Primary | ICD-10-CM

## 2023-05-29 PROBLEM — Z09 HOSPITAL DISCHARGE FOLLOW-UP: Status: RESOLVED | Noted: 2023-02-24 | Resolved: 2023-05-29

## 2023-06-14 ENCOUNTER — HOSPITAL ENCOUNTER (OUTPATIENT)
Dept: RADIOLOGY | Facility: HOSPITAL | Age: 53
Discharge: HOME | End: 2023-06-14
Attending: UROLOGY
Payer: COMMERCIAL

## 2023-06-14 DIAGNOSIS — R31.9 HEMATURIA SYNDROME: ICD-10-CM

## 2023-06-14 PROCEDURE — 74178 CT ABD&PLV WO CNTR FLWD CNTR: CPT | Mod: TC

## 2023-06-14 PROCEDURE — 25500020 PHARM REV CODE 255: Performed by: UROLOGY

## 2023-06-14 RX ADMIN — IOPAMIDOL 100 ML: 755 INJECTION, SOLUTION INTRAVENOUS at 11:06

## 2023-06-22 ENCOUNTER — LAB VISIT (OUTPATIENT)
Dept: LAB | Facility: HOSPITAL | Age: 53
End: 2023-06-22
Attending: INTERNAL MEDICINE
Payer: COMMERCIAL

## 2023-06-22 DIAGNOSIS — I50.22 CHRONIC SYSTOLIC HEART FAILURE: Primary | ICD-10-CM

## 2023-06-22 DIAGNOSIS — I48.0 PAROXYSMAL ATRIAL FIBRILLATION: ICD-10-CM

## 2023-06-22 LAB
ANION GAP SERPL CALC-SCNC: 9 MEQ/L
BUN SERPL-MCNC: 14.4 MG/DL (ref 8.4–25.7)
CALCIUM SERPL-MCNC: 9.1 MG/DL (ref 8.4–10.2)
CHLORIDE SERPL-SCNC: 104 MMOL/L (ref 98–107)
CO2 SERPL-SCNC: 27 MMOL/L (ref 22–29)
CREAT SERPL-MCNC: 1.51 MG/DL (ref 0.73–1.18)
CREAT/UREA NIT SERPL: 10
GFR SERPLBLD CREATININE-BSD FMLA CKD-EPI: 56 MLS/MIN/1.73/M2
GLUCOSE SERPL-MCNC: 102 MG/DL (ref 74–100)
POTASSIUM SERPL-SCNC: 3.9 MMOL/L (ref 3.5–5.1)
SODIUM SERPL-SCNC: 140 MMOL/L (ref 136–145)

## 2023-06-22 PROCEDURE — 36415 COLL VENOUS BLD VENIPUNCTURE: CPT

## 2023-06-22 PROCEDURE — 80048 BASIC METABOLIC PNL TOTAL CA: CPT

## 2023-09-12 DIAGNOSIS — I48.0 PAF (PAROXYSMAL ATRIAL FIBRILLATION): Primary | ICD-10-CM

## 2023-09-14 ENCOUNTER — LAB VISIT (OUTPATIENT)
Dept: LAB | Facility: HOSPITAL | Age: 53
End: 2023-09-14
Attending: INTERNAL MEDICINE
Payer: COMMERCIAL

## 2023-09-14 DIAGNOSIS — R94.31 NONSPECIFIC ABNORMAL ELECTROCARDIOGRAM (ECG) (EKG): Primary | ICD-10-CM

## 2023-09-14 DIAGNOSIS — I50.22 CHRONIC SYSTOLIC HEART FAILURE: ICD-10-CM

## 2023-09-14 DIAGNOSIS — I48.0 PAROXYSMAL ATRIAL FIBRILLATION: ICD-10-CM

## 2023-09-14 DIAGNOSIS — R06.09 DYSPNEA ON EXERTION: ICD-10-CM

## 2023-09-14 LAB
ANION GAP SERPL CALC-SCNC: 5 MEQ/L
BUN SERPL-MCNC: 14 MG/DL (ref 8.4–25.7)
CALCIUM SERPL-MCNC: 8.8 MG/DL (ref 8.4–10.2)
CHLORIDE SERPL-SCNC: 105 MMOL/L (ref 98–107)
CO2 SERPL-SCNC: 28 MMOL/L (ref 22–29)
CREAT SERPL-MCNC: 1.45 MG/DL (ref 0.73–1.18)
CREAT/UREA NIT SERPL: 10
ERYTHROCYTE [DISTWIDTH] IN BLOOD BY AUTOMATED COUNT: 15.1 % (ref 11.5–17)
GFR SERPLBLD CREATININE-BSD FMLA CKD-EPI: 58 MLS/MIN/1.73/M2
GLUCOSE SERPL-MCNC: 96 MG/DL (ref 74–100)
HCT VFR BLD AUTO: 52.4 % (ref 42–52)
HGB BLD-MCNC: 17.3 G/DL (ref 14–18)
MCH RBC QN AUTO: 29.1 PG (ref 27–31)
MCHC RBC AUTO-ENTMCNC: 33 G/DL (ref 33–36)
MCV RBC AUTO: 88.2 FL (ref 80–94)
NRBC BLD AUTO-RTO: 0 %
PLATELET # BLD AUTO: 154 X10(3)/MCL (ref 130–400)
PMV BLD AUTO: 10.8 FL (ref 7.4–10.4)
POTASSIUM SERPL-SCNC: 4.2 MMOL/L (ref 3.5–5.1)
RBC # BLD AUTO: 5.94 X10(6)/MCL (ref 4.7–6.1)
SODIUM SERPL-SCNC: 138 MMOL/L (ref 136–145)
WBC # SPEC AUTO: 6.06 X10(3)/MCL (ref 4.5–11.5)

## 2023-09-14 PROCEDURE — 85027 COMPLETE CBC AUTOMATED: CPT

## 2023-09-14 PROCEDURE — 80048 BASIC METABOLIC PNL TOTAL CA: CPT

## 2023-09-14 PROCEDURE — 36415 COLL VENOUS BLD VENIPUNCTURE: CPT

## 2023-09-18 ENCOUNTER — HOSPITAL ENCOUNTER (OUTPATIENT)
Dept: RADIOLOGY | Facility: HOSPITAL | Age: 53
Discharge: HOME | End: 2023-09-18
Attending: INTERNAL MEDICINE
Payer: COMMERCIAL

## 2023-09-18 DIAGNOSIS — I48.0 PAF (PAROXYSMAL ATRIAL FIBRILLATION): ICD-10-CM

## 2023-09-18 LAB
APPEARANCE UR: CLEAR
BACTERIA #/AREA URNS AUTO: NORMAL /HPF
BILIRUB UR QL STRIP.AUTO: NEGATIVE
COLOR UR: YELLOW
GLUCOSE UR QL STRIP.AUTO: >=1000
KETONES UR QL STRIP.AUTO: NEGATIVE
LEUKOCYTE ESTERASE UR QL STRIP.AUTO: NEGATIVE
NITRITE UR QL STRIP.AUTO: NEGATIVE
PH UR STRIP.AUTO: 7 [PH]
PROT UR QL STRIP.AUTO: 30
RBC #/AREA URNS AUTO: NORMAL /HPF
RBC UR QL AUTO: NEGATIVE
SP GR UR STRIP.AUTO: 1.02 (ref 1–1.03)
SQUAMOUS #/AREA URNS AUTO: NORMAL /HPF
UROBILINOGEN UR STRIP-ACNC: 0.2
WBC #/AREA URNS AUTO: NORMAL /HPF

## 2023-09-18 RX ORDER — OMEPRAZOLE 40 MG/1
40 CAPSULE, DELAYED RELEASE ORAL DAILY
COMMUNITY
Start: 2023-08-01

## 2023-09-18 RX ORDER — EMPAGLIFLOZIN 10 MG/1
10 TABLET, FILM COATED ORAL DAILY
COMMUNITY
Start: 2023-08-29

## 2023-09-19 ENCOUNTER — HOSPITAL ENCOUNTER (OUTPATIENT)
Dept: RADIOLOGY | Facility: HOSPITAL | Age: 53
Discharge: HOME | End: 2023-09-19
Attending: INTERNAL MEDICINE
Payer: COMMERCIAL

## 2023-09-19 DIAGNOSIS — I48.0 PAROXYSMAL ATRIAL FIBRILLATION: Primary | ICD-10-CM

## 2023-09-19 PROCEDURE — 25500020 PHARM REV CODE 255: Performed by: INTERNAL MEDICINE

## 2023-09-19 PROCEDURE — 71275 CT ANGIOGRAPHY CHEST: CPT | Mod: TC

## 2023-09-19 RX ADMIN — IOPAMIDOL 100 ML: 755 INJECTION, SOLUTION INTRAVENOUS at 01:09

## 2023-09-20 ENCOUNTER — ANESTHESIA EVENT (OUTPATIENT)
Dept: CARDIOLOGY | Facility: HOSPITAL | Age: 53
End: 2023-09-20
Payer: COMMERCIAL

## 2023-09-21 ENCOUNTER — HOSPITAL ENCOUNTER (OUTPATIENT)
Facility: HOSPITAL | Age: 53
Discharge: HOME | End: 2023-09-21
Attending: INTERNAL MEDICINE | Admitting: INTERNAL MEDICINE
Payer: COMMERCIAL

## 2023-09-21 ENCOUNTER — ANESTHESIA (OUTPATIENT)
Dept: CARDIOLOGY | Facility: HOSPITAL | Age: 53
End: 2023-09-21
Payer: COMMERCIAL

## 2023-09-21 ENCOUNTER — HOSPITAL ENCOUNTER (OUTPATIENT)
Dept: CARDIOLOGY | Facility: HOSPITAL | Age: 53
Discharge: HOME | End: 2023-09-21
Attending: INTERNAL MEDICINE
Payer: COMMERCIAL

## 2023-09-21 VITALS
OXYGEN SATURATION: 96 % | SYSTOLIC BLOOD PRESSURE: 103 MMHG | HEIGHT: 68 IN | BODY MASS INDEX: 27.87 KG/M2 | TEMPERATURE: 98 F | WEIGHT: 183.88 LBS | DIASTOLIC BLOOD PRESSURE: 76 MMHG | RESPIRATION RATE: 22 BRPM | HEART RATE: 63 BPM

## 2023-09-21 DIAGNOSIS — I48.0 PAROXYSMAL ATRIAL FIBRILLATION: ICD-10-CM

## 2023-09-21 DIAGNOSIS — I48.91 ATRIAL FIBRILLATION: ICD-10-CM

## 2023-09-21 DIAGNOSIS — I48.0 PAROXYSMAL ATRIAL FIBRILLATION: Primary | ICD-10-CM

## 2023-09-21 DIAGNOSIS — I48.91 A-FIB: ICD-10-CM

## 2023-09-21 LAB — BSA FOR ECHO PROCEDURE: 2 M2

## 2023-09-21 PROCEDURE — 25000003 PHARM REV CODE 250: Performed by: REGISTERED NURSE

## 2023-09-21 PROCEDURE — D9220A PRA ANESTHESIA: Mod: CRNA,,, | Performed by: REGISTERED NURSE

## 2023-09-21 PROCEDURE — 93005 ELECTROCARDIOGRAM TRACING: CPT

## 2023-09-21 PROCEDURE — 36620 INSERTION CATHETER ARTERY: CPT | Mod: 59,,, | Performed by: ANESTHESIOLOGY

## 2023-09-21 PROCEDURE — 37000008 HC ANESTHESIA 1ST 15 MINUTES: Performed by: INTERNAL MEDICINE

## 2023-09-21 PROCEDURE — 25000003 PHARM REV CODE 250: Performed by: INTERNAL MEDICINE

## 2023-09-21 PROCEDURE — D9220A PRA ANESTHESIA: Mod: ANES,,, | Performed by: ANESTHESIOLOGY

## 2023-09-21 PROCEDURE — 63600175 PHARM REV CODE 636 W HCPCS: Performed by: REGISTERED NURSE

## 2023-09-21 PROCEDURE — C1753 CATH, INTRAVAS ULTRASOUND: HCPCS | Performed by: INTERNAL MEDICINE

## 2023-09-21 PROCEDURE — 27201423 OPTIME MED/SURG SUP & DEVICES STERILE SUPPLY: Performed by: INTERNAL MEDICINE

## 2023-09-21 PROCEDURE — D9220A PRA ANESTHESIA: ICD-10-PCS | Mod: CRNA,,, | Performed by: REGISTERED NURSE

## 2023-09-21 PROCEDURE — 85347 COAGULATION TIME ACTIVATED: CPT | Performed by: INTERNAL MEDICINE

## 2023-09-21 PROCEDURE — C1730 CATH, EP, 19 OR FEW ELECT: HCPCS | Performed by: INTERNAL MEDICINE

## 2023-09-21 PROCEDURE — C1733 CATH, EP, OTHR THAN COOL-TIP: HCPCS | Performed by: INTERNAL MEDICINE

## 2023-09-21 PROCEDURE — 25500020 PHARM REV CODE 255: Performed by: INTERNAL MEDICINE

## 2023-09-21 PROCEDURE — 37000009 HC ANESTHESIA EA ADD 15 MINS: Performed by: INTERNAL MEDICINE

## 2023-09-21 PROCEDURE — D9220A PRA ANESTHESIA: ICD-10-PCS | Mod: ANES,,, | Performed by: ANESTHESIOLOGY

## 2023-09-21 PROCEDURE — 63600175 PHARM REV CODE 636 W HCPCS: Performed by: ANESTHESIOLOGY

## 2023-09-21 PROCEDURE — C1760 CLOSURE DEV, VASC: HCPCS | Performed by: INTERNAL MEDICINE

## 2023-09-21 PROCEDURE — 93325 DOPPLER ECHO COLOR FLOW MAPG: CPT

## 2023-09-21 PROCEDURE — C1769 GUIDE WIRE: HCPCS | Performed by: INTERNAL MEDICINE

## 2023-09-21 PROCEDURE — 36620 ARTERIAL: ICD-10-PCS | Mod: 59,,, | Performed by: ANESTHESIOLOGY

## 2023-09-21 PROCEDURE — 93656 COMPRE EP EVAL ABLTJ ATR FIB: CPT | Performed by: INTERNAL MEDICINE

## 2023-09-21 PROCEDURE — C1894 INTRO/SHEATH, NON-LASER: HCPCS | Performed by: INTERNAL MEDICINE

## 2023-09-21 PROCEDURE — C1766 INTRO/SHEATH,STRBLE,NON-PEEL: HCPCS | Performed by: INTERNAL MEDICINE

## 2023-09-21 DEVICE — SYS CLSR VASCADE MVP ID6-12FR: Type: IMPLANTABLE DEVICE | Site: GROIN | Status: FUNCTIONAL

## 2023-09-21 RX ORDER — HEPARIN SODIUM 10000 [USP'U]/100ML
INJECTION, SOLUTION INTRAVENOUS CONTINUOUS PRN
Status: DISCONTINUED | OUTPATIENT
Start: 2023-09-21 | End: 2023-09-21

## 2023-09-21 RX ORDER — ROCURONIUM BROMIDE 10 MG/ML
INJECTION, SOLUTION INTRAVENOUS
Status: DISCONTINUED | OUTPATIENT
Start: 2023-09-21 | End: 2023-09-21

## 2023-09-21 RX ORDER — PROPOFOL 10 MG/ML
VIAL (ML) INTRAVENOUS
Status: DISCONTINUED | OUTPATIENT
Start: 2023-09-21 | End: 2023-09-21

## 2023-09-21 RX ORDER — FAMOTIDINE 10 MG/ML
INJECTION INTRAVENOUS
Status: DISCONTINUED | OUTPATIENT
Start: 2023-09-21 | End: 2023-09-21

## 2023-09-21 RX ORDER — MIDAZOLAM HYDROCHLORIDE 1 MG/ML
2 INJECTION INTRAMUSCULAR; INTRAVENOUS ONCE AS NEEDED
Status: CANCELLED | OUTPATIENT
Start: 2023-09-21 | End: 2035-02-17

## 2023-09-21 RX ORDER — HEPARIN SODIUM 1000 [USP'U]/ML
INJECTION, SOLUTION INTRAVENOUS; SUBCUTANEOUS
Status: DISCONTINUED | OUTPATIENT
Start: 2023-09-21 | End: 2023-09-21

## 2023-09-21 RX ORDER — METOCLOPRAMIDE HYDROCHLORIDE 5 MG/ML
10 INJECTION INTRAMUSCULAR; INTRAVENOUS EVERY 10 MIN PRN
Status: DISCONTINUED | OUTPATIENT
Start: 2023-09-21 | End: 2023-09-21 | Stop reason: HOSPADM

## 2023-09-21 RX ORDER — ONDANSETRON 4 MG/1
4 TABLET, ORALLY DISINTEGRATING ORAL ONCE
Status: CANCELLED | OUTPATIENT
Start: 2023-09-21 | End: 2023-09-21

## 2023-09-21 RX ORDER — PROTAMINE SULFATE 10 MG/ML
INJECTION, SOLUTION INTRAVENOUS
Status: DISCONTINUED | OUTPATIENT
Start: 2023-09-21 | End: 2023-09-21

## 2023-09-21 RX ORDER — PHENYLEPHRINE HCL IN 0.9% NACL 1 MG/10 ML
SYRINGE (ML) INTRAVENOUS
Status: DISCONTINUED | OUTPATIENT
Start: 2023-09-21 | End: 2023-09-21

## 2023-09-21 RX ORDER — DIPHENHYDRAMINE HYDROCHLORIDE 50 MG/ML
25 INJECTION INTRAMUSCULAR; INTRAVENOUS EVERY 6 HOURS PRN
Status: DISCONTINUED | OUTPATIENT
Start: 2023-09-21 | End: 2023-09-21 | Stop reason: HOSPADM

## 2023-09-21 RX ORDER — ACETAMINOPHEN 325 MG/1
650 TABLET ORAL EVERY 4 HOURS PRN
Status: DISCONTINUED | OUTPATIENT
Start: 2023-09-21 | End: 2023-09-21 | Stop reason: HOSPADM

## 2023-09-21 RX ORDER — SODIUM CHLORIDE, SODIUM GLUCONATE, SODIUM ACETATE, POTASSIUM CHLORIDE AND MAGNESIUM CHLORIDE 30; 37; 368; 526; 502 MG/100ML; MG/100ML; MG/100ML; MG/100ML; MG/100ML
INJECTION, SOLUTION INTRAVENOUS CONTINUOUS
Status: CANCELLED | OUTPATIENT
Start: 2023-09-21 | End: 2023-10-21

## 2023-09-21 RX ORDER — MIDAZOLAM HYDROCHLORIDE 1 MG/ML
INJECTION INTRAMUSCULAR; INTRAVENOUS
Status: DISCONTINUED | OUTPATIENT
Start: 2023-09-21 | End: 2023-09-21

## 2023-09-21 RX ORDER — MORPHINE SULFATE 4 MG/ML
2 INJECTION, SOLUTION INTRAMUSCULAR; INTRAVENOUS EVERY 4 HOURS PRN
Status: DISCONTINUED | OUTPATIENT
Start: 2023-09-21 | End: 2023-09-21 | Stop reason: HOSPADM

## 2023-09-21 RX ORDER — SUCCINYLCHOLINE CHLORIDE 20 MG/ML
INJECTION INTRAMUSCULAR; INTRAVENOUS
Status: DISCONTINUED | OUTPATIENT
Start: 2023-09-21 | End: 2023-09-21

## 2023-09-21 RX ORDER — ONDANSETRON HYDROCHLORIDE 2 MG/ML
INJECTION, SOLUTION INTRAMUSCULAR; INTRAVENOUS
Status: DISCONTINUED | OUTPATIENT
Start: 2023-09-21 | End: 2023-09-21

## 2023-09-21 RX ORDER — LIDOCAINE HYDROCHLORIDE 10 MG/ML
1 INJECTION, SOLUTION EPIDURAL; INFILTRATION; INTRACAUDAL; PERINEURAL ONCE
Status: CANCELLED | OUTPATIENT
Start: 2023-09-21 | End: 2023-09-21

## 2023-09-21 RX ORDER — METHYLPREDNISOLONE ACETATE 80 MG/ML
INJECTION, SUSPENSION INTRA-ARTICULAR; INTRALESIONAL; INTRAMUSCULAR; SOFT TISSUE
Status: DISCONTINUED | OUTPATIENT
Start: 2023-09-21 | End: 2023-09-21

## 2023-09-21 RX ORDER — ONDANSETRON 2 MG/ML
4 INJECTION INTRAMUSCULAR; INTRAVENOUS DAILY PRN
Status: DISCONTINUED | OUTPATIENT
Start: 2023-09-21 | End: 2023-09-21 | Stop reason: HOSPADM

## 2023-09-21 RX ORDER — LIDOCAINE HYDROCHLORIDE 10 MG/ML
INJECTION INFILTRATION; PERINEURAL
Status: DISCONTINUED | OUTPATIENT
Start: 2023-09-21 | End: 2023-09-21 | Stop reason: HOSPADM

## 2023-09-21 RX ORDER — HYDROCODONE BITARTRATE AND ACETAMINOPHEN 5; 325 MG/1; MG/1
1 TABLET ORAL EVERY 4 HOURS PRN
Status: DISCONTINUED | OUTPATIENT
Start: 2023-09-21 | End: 2023-09-21 | Stop reason: HOSPADM

## 2023-09-21 RX ORDER — FENTANYL CITRATE 50 UG/ML
INJECTION, SOLUTION INTRAMUSCULAR; INTRAVENOUS
Status: DISCONTINUED | OUTPATIENT
Start: 2023-09-21 | End: 2023-09-21

## 2023-09-21 RX ORDER — HYDROMORPHONE HYDROCHLORIDE 2 MG/ML
0.2 INJECTION, SOLUTION INTRAMUSCULAR; INTRAVENOUS; SUBCUTANEOUS EVERY 5 MIN PRN
Status: DISCONTINUED | OUTPATIENT
Start: 2023-09-21 | End: 2023-09-21 | Stop reason: HOSPADM

## 2023-09-21 RX ADMIN — HEPARIN SODIUM 1000 UNITS/HR: 10000 INJECTION, SOLUTION INTRAVENOUS at 11:09

## 2023-09-21 RX ADMIN — ONDANSETRON 4 MG: 2 INJECTION INTRAMUSCULAR; INTRAVENOUS at 02:09

## 2023-09-21 RX ADMIN — Medication 50 MCG: at 11:09

## 2023-09-21 RX ADMIN — ONDANSETRON 4 MG: 2 INJECTION INTRAMUSCULAR; INTRAVENOUS at 12:09

## 2023-09-21 RX ADMIN — MIDAZOLAM HYDROCHLORIDE 2 MG: 1 INJECTION, SOLUTION INTRAMUSCULAR; INTRAVENOUS at 10:09

## 2023-09-21 RX ADMIN — PROPOFOL 100 MG: 10 INJECTION, EMULSION INTRAVENOUS at 10:09

## 2023-09-21 RX ADMIN — HEPARIN SODIUM 200 UNITS: 1000 INJECTION, SOLUTION INTRAVENOUS; SUBCUTANEOUS at 12:09

## 2023-09-21 RX ADMIN — VANCOMYCIN HYDROCHLORIDE 1000 MG: 1 INJECTION, POWDER, LYOPHILIZED, FOR SOLUTION INTRAVENOUS at 10:09

## 2023-09-21 RX ADMIN — HEPARIN SODIUM 9000 UNITS: 1000 INJECTION, SOLUTION INTRAVENOUS; SUBCUTANEOUS at 11:09

## 2023-09-21 RX ADMIN — FENTANYL CITRATE 100 MCG: 50 INJECTION, SOLUTION INTRAMUSCULAR; INTRAVENOUS at 10:09

## 2023-09-21 RX ADMIN — SUCCINYLCHOLINE CHLORIDE 200 MG: 20 INJECTION, SOLUTION INTRAMUSCULAR; INTRAVENOUS at 10:09

## 2023-09-21 RX ADMIN — Medication 100 MCG: at 01:09

## 2023-09-21 RX ADMIN — METHYLPREDNISOLONE ACETATE 15 MG: 80 INJECTION, SUSPENSION INTRA-ARTICULAR; INTRALESIONAL; INTRAMUSCULAR; SOFT TISSUE at 10:09

## 2023-09-21 RX ADMIN — PROTAMINE SULFATE 40 MG: 10 INJECTION, SOLUTION INTRAVENOUS at 12:09

## 2023-09-21 RX ADMIN — ROCURONIUM BROMIDE 10 MG: 10 SOLUTION INTRAVENOUS at 10:09

## 2023-09-21 RX ADMIN — Medication 100 MCG: at 11:09

## 2023-09-21 RX ADMIN — Medication 100 MCG: at 12:09

## 2023-09-21 RX ADMIN — SODIUM CHLORIDE, SODIUM GLUCONATE, SODIUM ACETATE, POTASSIUM CHLORIDE AND MAGNESIUM CHLORIDE: 526; 502; 368; 37; 30 INJECTION, SOLUTION INTRAVENOUS at 10:09

## 2023-09-21 RX ADMIN — FAMOTIDINE 20 MG: 10 INJECTION, SOLUTION INTRAVENOUS at 10:09

## 2023-09-21 RX ADMIN — PHENYLEPHRINE HYDROCHLORIDE 35 MCG/MIN: 10 INJECTION INTRAVENOUS at 11:09

## 2023-09-21 NOTE — Clinical Note
A dressing was applied to the right femoral vein puncture site. All four femoral venous access sites covered with guaze and tegaderm.

## 2023-09-21 NOTE — Clinical Note
All sedation medication and monitoring via anesthesia, pt intubated per anesthesia, refer to anesthesia documentation.

## 2023-09-21 NOTE — ANESTHESIA PROCEDURE NOTES
Arterial    Diagnosis: ablation    Patient location during procedure: done in OR    Staffing  Authorizing Provider: Andres Fitch MD  Performing Provider: Tracie Doss CRNA    Staffing  Performed by: Tracie Doss CRNA  Authorized by: Andres Fitch MD    Anesthesiologist was present at the time of the procedure.    Preanesthetic Checklist  Completed: patient identified, IV checked, site marked, risks and benefits discussed, surgical consent, monitors and equipment checked, pre-op evaluation, timeout performed and anesthesia consent givenArterial  Skin Prep: chlorhexidine gluconate  Local Infiltration: lidocaine  Orientation: left  Location: radial    Catheter Size: 20 G  Catheter placement by Anatomical landmarks. Heme positive aspiration all ports. Insertion Attempts: 1  Assessment  Dressing: secured with tape and tegaderm

## 2023-09-21 NOTE — TRANSFER OF CARE
"Anesthesia Transfer of Care Note    Patient: Clifton Davidson    Procedure(s) Performed: Procedure(s) (LRB):  Ablation (N/A)  Transesophageal echo (DARBY) intra-procedure log documentation (N/A)    Patient location: PACU    Anesthesia Type: general    Transport from OR: Transported from OR on 6-10 L/min O2 by face mask with adequate spontaneous ventilation    Post pain: adequate analgesia    Post assessment: no apparent anesthetic complications and tolerated procedure well    Post vital signs: stable    Level of consciousness: responds to stimulation and sedated    Nausea/Vomiting: no nausea/vomiting    Complications: none    Transfer of care protocol was followed      Last vitals:   Visit Vitals  BP 94/70 (BP Location: Right arm, Patient Position: Lying)   Pulse 63   Temp 37 °C (98.6 °F)   Resp 12   Ht 5' 8" (1.727 m)   Wt 83.4 kg (183 lb 13.8 oz)   SpO2 100%   BMI 27.96 kg/m²     "

## 2023-09-21 NOTE — PROGRESS NOTES
Pt Hx and procedure discussed in detail. Post op orders reviewed. Pt attached to v/s machine and vitals reviewed. Procedure sites assessed and intact. Everyone understands pt info with no further questions. EKG on chart

## 2023-09-21 NOTE — DISCHARGE INSTRUCTIONS
-Remove dressing in 24hrs  -No driving for two Days  -Do not lift anything heavier than a gallon of milk for 5 days  -No tub bathing for 5 days (if you have a groin site).   -No lotions, powders, creams around site for 5 days  - Return to the nearest emergency room if you start running a fever; have any kind of discharge coming from the site, the site looks red or swollen.  - If site starts to bleed, lay flat on the ground, apply pressure to the site and call 911.   If no urination within 8 hours of removal of the cote catheter- head to nearest ER     Resume Eliquis tonight.

## 2023-09-21 NOTE — ANESTHESIA PREPROCEDURE EVALUATION
"                                                                                                             09/21/2023  Clifton Davidson is a 52 y.o., male who presents with Paroxysmal atrial fibrillationa and h/o Cardiomyopathy.  Diagnosis: Paroxysmal atrial fibrillation [I48.0]    The pt. Comes to [facility] Cath.Lab for the noted procedure under GETA w/ artreial line and 2 peripheral IV's.  Procedures:        Ablation (Chest) - EPS + AF CATH ABLATION +/- CRYOBALLON ABLATION W/ DARBY       Transesophageal echo (DARBY) intra-procedure log documentation      PMHx:  Other Medical History   Personal history of colonic polyps NICM (nonischemic cardiomyopathy)   Chronic systolic heart failure Paroxysmal atrial fibrillation   Lightheadedness Palpitations   Fatigue Sleep apnea   Digestive disorder      Surgical History:  COLONOSCOPY INSERTION OF BIVENTRICULAR IMPLANTABLE CARDIOVERTER-DEFIBRILLATOR (ICD)   TRANSESOPHAGEAL ECHOCARDIOGRAPHY            Vital signs:  Pre Vitals     Current as of 09/21/23 0929  BP: 125/86 Pulse: 64   Resp:  SpO2: 98   Temp:    Height: 5' 8" (1.727 m) (09/21/23) Weight: 83.4 kg (183 lb 13.8 oz) (09/21/23)   BMI: 28 IBW: 68.4 kg (150 lb 12.1 oz)   Last edited 09/21/23 0831 by DI          Lab Data:      EKG:      Pre-op Assessment    I have reviewed the Patient Summary Reports.     I have reviewed the Nursing Notes. I have reviewed the NPO Status.   I have reviewed the Medications.     Review of Systems  Anesthesia Hx:  Pt. Notes "Panic Attack" prior to sedation for previous procedure.    Unable to find Anesthetic record for procedure. Personal Hx of Anesthesia complications   Social:  Non-Smoker    Hematology/Oncology:  Hematology Normal   Oncology Normal     EENT/Dental:EENT/Dental Normal   Cardiovascular:   Exercise tolerance: good Dysrhythmias atrial fibrillation CHF Cardiomyopathy Functional Capacity good / => 4 METS    Pulmonary:  Pulmonary Normal    Renal/:  Renal/ Normal   "   Hepatic/GI:  Hepatic/GI Normal    Musculoskeletal:  Musculoskeletal Normal    Neurological:  Neurology Normal    Endocrine:  Endocrine Normal    Dermatological:  Skin Normal    Psych:  Psychiatric Normal           Physical Exam  General: Alert, Oriented, Well nourished and Cooperative    Airway:  Mallampati: II   Mouth Opening: Normal  TM Distance: Normal  Tongue: Normal  Neck ROM: Normal ROM    Dental:  Intact    Chest/Lungs:  Clear to auscultation, Normal Respiratory Rate    Heart:  Rate: Normal  Rhythm: Regular Rhythm        Anesthesia Plan  Type of Anesthesia, risks & benefits discussed:    Anesthesia Type: Gen ETT  Intra-op Monitoring Plan: Standard ASA Monitors and Art Line  Post Op Pain Control Plan: IV/PO Opioids PRN  Induction:  IV and Inhalation  Airway Plan: Direct  Informed Consent: Informed consent signed with the Patient and all parties understand the risks and agree with anesthesia plan.  All questions answered. Patient consented to blood products? Yes  ASA Score: 3  Day of Surgery Review of History & Physical: H&P Update referred to the surgeon/provider.  Anesthesia Plan Notes: GETA w/ arterial line and 2 peripheral IV's    Ready For Surgery From Anesthesia Perspective.     .

## 2023-09-21 NOTE — DISCHARGE SUMMARY
[facility] - Cath Lab Services  Discharge Note  Short Stay    Procedure(s) (LRB):  Ablation (N/A)  Transesophageal echo (DARBY) intra-procedure log documentation (N/A)      OUTCOME: Patient tolerated treatment/procedure well without complication and is now ready for discharge.    DISPOSITION: Home or Self Care    FINAL DIAGNOSIS:  Paroxysmal atrial fibrillation    FOLLOWUP: In clinic    DISCHARGE INSTRUCTIONS:  No discharge procedures on file.     TIME SPENT ON DISCHARGE: 15 minutes

## 2023-09-21 NOTE — ANESTHESIA PROCEDURE NOTES
Intubation    Date/Time: 9/21/2023 10:40 AM    Performed by: Tracie Doss CRNA  Authorized by: Andres Fitch MD    Intubation:     Intubated:  Postinduction    Mask Ventilation:  Easy with oral airway    Attempts:  1    Attempted By:  Student and CRNA    Method of Intubation:  Direct    Blade:  Cuevas 2    Laryngeal View Grade: Grade IIA - cords partially seen      Difficult Airway Encountered?: No      Complications:  None    Airway Device:  Oral endotracheal tube    Airway Device Size:  8.0    Style/Cuff Inflation:  Cuffed (inflated to minimal occlusive pressure)    Tube secured:  23    Secured at:  The lips    Placement Verified By:  Capnometry and Revisualization with laryngoscopy    Complicating Factors:  None    Findings Post-Intubation:  BS equal bilateral and atraumatic/condition of teeth unchanged

## 2023-09-22 ENCOUNTER — PATIENT MESSAGE (OUTPATIENT)
Dept: ADMINISTRATIVE | Facility: OTHER | Age: 53
End: 2023-09-22
Payer: COMMERCIAL

## 2023-09-22 NOTE — ANESTHESIA POSTPROCEDURE EVALUATION
Anesthesia Post Evaluation    Patient: Clifton Davidson    Procedure(s) Performed: Procedure(s) (LRB):  Ablation (N/A)  Transesophageal echo (DARBY) intra-procedure log documentation (N/A)    Final Anesthesia Type: general      Patient location during evaluation: PACU  Patient participation: Yes- Able to Participate  Level of consciousness: awake and alert and oriented  Post-procedure vital signs: reviewed and stable  Pain management: adequate  Airway patency: patent  NOHEMY mitigation strategies: Verification of full reversal of neuromuscular block  PONV status at discharge: No PONV  Anesthetic complications: no      Cardiovascular status: blood pressure returned to baseline and stable  Respiratory status: spontaneous ventilation and unassisted  Hydration status: euvolemic  Follow-up not needed.  Comments: [facility]          Vitals Value Taken Time   /76 09/21/23 1701   Temp 36.4 °C (97.5 °F) 09/21/23 1330   Pulse 67 09/21/23 1712   Resp 13 09/21/23 1711   SpO2 99 % 09/21/23 1710   Vitals shown include unvalidated device data.      Event Time   Out of Recovery 14:55:44         Pain/Stiven Score: Stiven Score: 10 (9/21/2023  4:42 PM)

## 2023-11-28 ENCOUNTER — OFFICE VISIT (OUTPATIENT)
Dept: FAMILY MEDICINE | Facility: CLINIC | Age: 53
End: 2023-11-28
Payer: COMMERCIAL

## 2023-11-28 VITALS
TEMPERATURE: 98 F | SYSTOLIC BLOOD PRESSURE: 106 MMHG | WEIGHT: 190.88 LBS | DIASTOLIC BLOOD PRESSURE: 70 MMHG | HEART RATE: 63 BPM | RESPIRATION RATE: 18 BRPM | HEIGHT: 68 IN | BODY MASS INDEX: 28.93 KG/M2

## 2023-11-28 DIAGNOSIS — Z00.00 WELLNESS EXAMINATION: Primary | ICD-10-CM

## 2023-11-28 DIAGNOSIS — I42.8 NICM (NONISCHEMIC CARDIOMYOPATHY): ICD-10-CM

## 2023-11-28 PROCEDURE — 1160F PR REVIEW ALL MEDS BY PRESCRIBER/CLIN PHARMACIST DOCUMENTED: ICD-10-PCS | Mod: CPTII,,, | Performed by: NURSE PRACTITIONER

## 2023-11-28 PROCEDURE — 1159F MED LIST DOCD IN RCRD: CPT | Mod: CPTII,,, | Performed by: NURSE PRACTITIONER

## 2023-11-28 PROCEDURE — 3044F PR MOST RECENT HEMOGLOBIN A1C LEVEL <7.0%: ICD-10-PCS | Mod: CPTII,,, | Performed by: NURSE PRACTITIONER

## 2023-11-28 PROCEDURE — 99396 PR PREVENTIVE VISIT,EST,40-64: ICD-10-PCS | Mod: ,,, | Performed by: NURSE PRACTITIONER

## 2023-11-28 PROCEDURE — 4010F ACE/ARB THERAPY RXD/TAKEN: CPT | Mod: CPTII,,, | Performed by: NURSE PRACTITIONER

## 2023-11-28 PROCEDURE — 3008F PR BODY MASS INDEX (BMI) DOCUMENTED: ICD-10-PCS | Mod: CPTII,,, | Performed by: NURSE PRACTITIONER

## 2023-11-28 PROCEDURE — 3044F HG A1C LEVEL LT 7.0%: CPT | Mod: CPTII,,, | Performed by: NURSE PRACTITIONER

## 2023-11-28 PROCEDURE — 1159F PR MEDICATION LIST DOCUMENTED IN MEDICAL RECORD: ICD-10-PCS | Mod: CPTII,,, | Performed by: NURSE PRACTITIONER

## 2023-11-28 PROCEDURE — 3074F SYST BP LT 130 MM HG: CPT | Mod: CPTII,,, | Performed by: NURSE PRACTITIONER

## 2023-11-28 PROCEDURE — 3078F DIAST BP <80 MM HG: CPT | Mod: CPTII,,, | Performed by: NURSE PRACTITIONER

## 2023-11-28 PROCEDURE — 3008F BODY MASS INDEX DOCD: CPT | Mod: CPTII,,, | Performed by: NURSE PRACTITIONER

## 2023-11-28 PROCEDURE — 1160F RVW MEDS BY RX/DR IN RCRD: CPT | Mod: CPTII,,, | Performed by: NURSE PRACTITIONER

## 2023-11-28 PROCEDURE — 3078F PR MOST RECENT DIASTOLIC BLOOD PRESSURE < 80 MM HG: ICD-10-PCS | Mod: CPTII,,, | Performed by: NURSE PRACTITIONER

## 2023-11-28 PROCEDURE — 99396 PREV VISIT EST AGE 40-64: CPT | Mod: ,,, | Performed by: NURSE PRACTITIONER

## 2023-11-28 PROCEDURE — 3074F PR MOST RECENT SYSTOLIC BLOOD PRESSURE < 130 MM HG: ICD-10-PCS | Mod: CPTII,,, | Performed by: NURSE PRACTITIONER

## 2023-11-28 PROCEDURE — 4010F PR ACE/ARB THEARPY RXD/TAKEN: ICD-10-PCS | Mod: CPTII,,, | Performed by: NURSE PRACTITIONER

## 2023-11-28 RX ORDER — ASPIRIN 325 MG
325 TABLET, DELAYED RELEASE (ENTERIC COATED) ORAL DAILY
COMMUNITY

## 2023-11-28 NOTE — PROGRESS NOTES
Patient ID: [id number]     Chief Complaint: Annual Exam      HPI:     Clifton Davidson is a 53 y.o. male here today for an annual wellness visit. No other complaints today.       Health Maintenance   Topic Date Due    Hepatitis C Screening  Never done    TETANUS VACCINE  Never done    Shingles Vaccine (1 of 2) Never done    Lipid Panel  09/18/2028    Colorectal Cancer Screening  12/21/2031     Dental Exam - Recommend biannually  Vaccinations -   Immunization History   Administered Date(s) Administered    COVID-19, MRNA, LN-S, PF (MODERNA FULL 0.5 ML DOSE) 11/11/2021    COVID-19, mRNA, LNP-S, bivalent booster, PF (PFIZER OMICRON) 12/16/2022    COVID-19, vector-nr, rS-Ad26, PF ([employer]) 03/19/2021    Influenza (FLUBLOK) - Quadrivalent - Recombinant - PF *Preferred* (egg allergy) 10/06/2020    Influenza - Intranasal - Trivalent 11/19/2015    Influenza - Quadrivalent - High Dose - PF (65 years and older) 09/01/2015    Influenza - Quadrivalent - PF *Preferred* (6 months and older) 10/10/2019, 11/11/2021    Pneumococcal Polysaccharide - 23 Valent 11/06/2015        Past Medical History:  has a past medical history of Chronic systolic heart failure, Digestive disorder, Fatigue, Lightheadedness, NICM (nonischemic cardiomyopathy), Palpitations, Paroxysmal atrial fibrillation, Personal history of colonic polyps, and Sleep apnea.    Surgical History:  has a past surgical history that includes Colonoscopy (12/21/2021); Insertion of biventricular implantable cardioverter-defibrillator (ICD); Transesophageal echocardiography; Cardiac biopsy; Ablation (N/A, 9/21/2023); and echocardiogram,transesophageal (N/A, 9/21/2023).    Family History: family history includes Coronary artery disease in his brother; Valvular heart disease in his mother.    Social History:  reports that he has never smoked. He has never used smokeless tobacco. He reports that he does not drink alcohol and does not use drugs.    Current Outpatient Medications  "  Medication Instructions    aspirin (ECOTRIN) 325 mg, Oral, Daily    furosemide (LASIX) 40 mg, Oral, Daily    JARDIANCE 10 mg, Oral, Daily    levocetirizine (XYZAL) 5 mg, Oral, Nightly    omeprazole (PRILOSEC) 40 mg, Oral, Daily    sacubitriL-valsartan (ENTRESTO) 49-51 mg per tablet 1 tablet, Oral, 2 times daily    sotaloL (BETAPACE) 120 mg, Oral, 2 times daily       Patient is allergic to crab and imdur [isosorbide mononitrate].     Patient Care Team:  Germania Fairbanks FNP as PCP - General (Nurse Practitioner)       Subjective:     Review of Systems    12 point review of systems conducted, negative except as stated in the history of present illness. See HPI for details.      Objective:     Visit Vitals  /70 (BP Location: Left arm, Patient Position: Sitting)   Pulse 63   Temp 97.8 °F (36.6 °C) (Oral)   Resp 18   Ht 5' 8" (1.727 m)   Wt 86.6 kg (190 lb 14.4 oz)   SpO2 (P) 96%   BMI 29.03 kg/m²       Physical Exam    General: Alert and oriented, No acute distress.  Head: Normocephalic, Atraumatic.  Eye: Pupils are equal, round and reactive to light, Extraocular movements are intact, Sclera non-icteric.  Ears/Nose/Throat: Normal, Mucosa moist,Clear.  Neck/Thyroid: Supple, Non-tender, No carotid bruit, No lymphadenopathy, No JVD, Full range of motion.  Respiratory: Clear to auscultation bilaterally; No wheezes, rales or rhonchi,Non-labored respirations, Symmetrical chest wall expansion.  Cardiovascular: Regular rate and rhythm, S1/S2 normal, No murmurs, rubs or gallops.  Gastrointestinal: Soft, Non-tender, Non-distended, Normal bowel sounds, No palpable organomegaly.  Musculoskeletal: Normal range of motion.  Integumentary: Warm, Dry, Intact, No suspicious lesions or rashes.  Extremities: No clubbing, cyanosis or edema  Neurologic: No focal deficits, Cranial Nerves II-XII are grossly intact, Motor strength normal upper and lower extremities, Sensory exam intact.  Psychiatric: Normal interaction, Coherent " speech, Euthymic mood, Appropriate affect     Labs Reviewed:     Chemistry:  Lab Results   Component Value Date     09/18/2023    K 4.4 09/18/2023    CHLORIDE 102 09/18/2023    BUN 12.5 09/18/2023    CREATININE 1.43 (H) 09/18/2023    EGFRNORACEVR 59 09/18/2023    GLUCOSE 94 09/18/2023    CALCIUM 9.5 09/18/2023    ALKPHOS 55 09/18/2023    LABPROT 7.3 09/18/2023    ALBUMIN 4.1 09/18/2023    BILIDIR 0.4 04/07/2022    IBILI 0.80 04/07/2022    AST 26 09/18/2023    ALT 15 09/18/2023    MG 2.00 09/18/2023    PHOS 2.8 07/27/2017    YIDOAYGO42TY 29.1 (L) 09/18/2023    TSH 1.224 09/18/2023    PSA 0.77 09/18/2023        Lab Results   Component Value Date    HGBA1C 5.3 09/18/2023        Hematology:  Lab Results   Component Value Date    WBC 6.29 09/18/2023    HGB 17.4 09/18/2023    HCT 54.0 (H) 09/18/2023     09/18/2023       Lipid Panel:  Lab Results   Component Value Date    CHOL 153 09/18/2023    HDL 37 09/18/2023    LDL 99.00 09/18/2023    TRIG 84 09/18/2023    TOTALCHOLEST 4 09/18/2023        Urine:  Lab Results   Component Value Date    COLORUA Yellow 09/18/2023    APPEARANCEUA Clear 09/18/2023    SGUA 1.020 09/18/2023    PHUA 7.0 09/18/2023    PROTEINUA 30 (A) 09/18/2023    GLUCOSEUA >=1000 (A) 09/18/2023    KETONESUA Negative 09/18/2023    BLOODUA Negative 09/18/2023    NITRITESUA Negative 09/18/2023    LEUKOCYTESUR Negative 09/18/2023    RBCUA None Seen 09/18/2023    WBCUA None Seen 09/18/2023    BACTERIA None Seen 09/18/2023    CREATRANDUR 94.6 09/18/2023    PROTEINURINE <5.0 07/27/2017          Assessment:       ICD-10-CM ICD-9-CM   1. Wellness examination  Z00.00 V70.0   2. NICM (nonischemic cardiomyopathy)  I42.8 425.4        Plan:   1. Wellness examination    Healthy lifestyle discussed.  DASH diet advised.    2. NICM (nonischemic cardiomyopathy)  Stable.  Managed by Cardiology.      Follow up in about 6 months (around 5/28/2024) for HTN. In addition to their scheduled follow up, the patient has also  been instructed to follow up on as needed basis.     Future Appointments   Date Time Provider Department Center   5/28/2024  2:00 PM Germania Fairbanks FNP [facility]        [facility]

## 2023-11-28 NOTE — PATIENT INSTRUCTIONS
Homer Lazo,     If you are due for any health screening(s) below please notify me so we can arrange them to be ordered and scheduled. Most healthy patients at your age complete them, but you are free to accept or refuse.     If you can't do it, I'll definitely understand. If you can, I'd certainly appreciate it!    All of your core healthy metrics are met.

## 2023-12-30 ENCOUNTER — HOSPITAL ENCOUNTER (EMERGENCY)
Facility: HOSPITAL | Age: 53
Discharge: HOME | End: 2023-12-30
Attending: FAMILY MEDICINE
Payer: COMMERCIAL

## 2023-12-30 VITALS
TEMPERATURE: 99 F | RESPIRATION RATE: 20 BRPM | WEIGHT: 182 LBS | HEIGHT: 68 IN | BODY MASS INDEX: 27.58 KG/M2 | OXYGEN SATURATION: 98 % | HEART RATE: 76 BPM | SYSTOLIC BLOOD PRESSURE: 115 MMHG | DIASTOLIC BLOOD PRESSURE: 81 MMHG

## 2023-12-30 DIAGNOSIS — J40 BRONCHITIS: Primary | ICD-10-CM

## 2023-12-30 PROCEDURE — 0240U COVID/FLU A&B PCR: CPT | Performed by: FAMILY MEDICINE

## 2023-12-30 PROCEDURE — 99283 EMERGENCY DEPT VISIT LOW MDM: CPT

## 2023-12-30 RX ORDER — AZITHROMYCIN 250 MG/1
250 TABLET, FILM COATED ORAL DAILY
Qty: 6 TABLET | Refills: 0 | Status: SHIPPED | OUTPATIENT
Start: 2023-12-30

## 2024-03-04 PROBLEM — Z00.00 WELLNESS EXAMINATION: Status: RESOLVED | Noted: 2022-11-21 | Resolved: 2024-03-04

## 2024-04-03 ENCOUNTER — HOSPITAL ENCOUNTER (EMERGENCY)
Facility: HOSPITAL | Age: 54
Discharge: HOME | End: 2024-04-03
Attending: EMERGENCY MEDICINE
Payer: COMMERCIAL

## 2024-04-03 VITALS
BODY MASS INDEX: 27.67 KG/M2 | DIASTOLIC BLOOD PRESSURE: 81 MMHG | SYSTOLIC BLOOD PRESSURE: 112 MMHG | WEIGHT: 182 LBS | OXYGEN SATURATION: 97 % | HEART RATE: 70 BPM | RESPIRATION RATE: 20 BRPM | TEMPERATURE: 98 F

## 2024-04-03 DIAGNOSIS — R05.9 COUGH, UNSPECIFIED TYPE: ICD-10-CM

## 2024-04-03 DIAGNOSIS — J06.9 UPPER RESPIRATORY TRACT INFECTION, UNSPECIFIED TYPE: Primary | ICD-10-CM

## 2024-04-03 DIAGNOSIS — B34.9 VIRAL SYNDROME: ICD-10-CM

## 2024-04-03 PROCEDURE — 0240U COVID/FLU A&B PCR: CPT | Performed by: EMERGENCY MEDICINE

## 2024-04-03 PROCEDURE — 99284 EMERGENCY DEPT VISIT MOD MDM: CPT | Mod: 25

## 2024-04-03 PROCEDURE — 63600175 PHARM REV CODE 636 W HCPCS: Performed by: STUDENT IN AN ORGANIZED HEALTH CARE EDUCATION/TRAINING PROGRAM

## 2024-04-03 PROCEDURE — 96372 THER/PROPH/DIAG INJ SC/IM: CPT | Performed by: STUDENT IN AN ORGANIZED HEALTH CARE EDUCATION/TRAINING PROGRAM

## 2024-04-03 RX ORDER — ALBUTEROL SULFATE 90 UG/1
1-2 AEROSOL, METERED RESPIRATORY (INHALATION) EVERY 6 HOURS PRN
Qty: 18 G | Refills: 0 | Status: SHIPPED | OUTPATIENT
Start: 2024-04-03 | End: 2024-05-03

## 2024-04-03 RX ORDER — DEXAMETHASONE SODIUM PHOSPHATE 4 MG/ML
8 INJECTION, SOLUTION INTRA-ARTICULAR; INTRALESIONAL; INTRAMUSCULAR; INTRAVENOUS; SOFT TISSUE
Status: COMPLETED | OUTPATIENT
Start: 2024-04-03 | End: 2024-04-03

## 2024-04-03 RX ORDER — PREDNISONE 50 MG/1
50 TABLET ORAL DAILY
Qty: 5 TABLET | Refills: 0 | Status: SHIPPED | OUTPATIENT
Start: 2024-04-03 | End: 2024-04-08

## 2024-04-03 RX ORDER — FLUTICASONE PROPIONATE 50 MCG
1 SPRAY, SUSPENSION (ML) NASAL DAILY
Qty: 15.8 ML | Refills: 0 | Status: SHIPPED | OUTPATIENT
Start: 2024-04-03 | End: 2024-04-18

## 2024-04-03 RX ORDER — BENZONATATE 100 MG/1
100 CAPSULE ORAL 3 TIMES DAILY PRN
Qty: 20 CAPSULE | Refills: 0 | Status: SHIPPED | OUTPATIENT
Start: 2024-04-03 | End: 2024-04-13

## 2024-04-03 RX ORDER — GUAIFENESIN, PSEUDOEPHEDRINE HYDROCHLORIDE 600; 60 MG/1; MG/1
1 TABLET, EXTENDED RELEASE ORAL 2 TIMES DAILY
Qty: 20 TABLET | Refills: 0 | Status: SHIPPED | OUTPATIENT
Start: 2024-04-03 | End: 2024-04-13

## 2024-04-03 RX ADMIN — DEXAMETHASONE SODIUM PHOSPHATE 8 MG: 4 INJECTION, SOLUTION INTRA-ARTICULAR; INTRALESIONAL; INTRAMUSCULAR; INTRAVENOUS; SOFT TISSUE at 08:04

## 2024-04-03 NOTE — ED PROVIDER NOTES
Encounter Date: 4/3/2024       History     Chief Complaint   Patient presents with    Influenza     Like s/s x1 week- fever/bodyaches- sinus pain    Cough    Chills    Headache    Fever     This 53-year-old man presents with flu-like symptoms for a week fever body aches and congestion.       VAL Sosa HPI   Patient is a 53-year-old male who presents to the emergency department, past medical history of paroxysmal AFib, palpitations presents for flu-like symptoms x1 week.  Body aches, nasal congestion, cough.  Wife is also sick with similar symptoms.    Review of patient's allergies indicates:   Allergen Reactions    Crab Swelling     Allergic to dungenous crab which causes lips to swell., no allergy to contrast media.    Imdur [isosorbide mononitrate] Other (See Comments)     Severe headaches     Past Medical History:   Diagnosis Date    Chronic systolic heart failure     Digestive disorder     acid reflux    Fatigue     Lightheadedness     NICM (nonischemic cardiomyopathy)     Palpitations     Paroxysmal atrial fibrillation     Personal history of colonic polyps 12/21/2021    Sleep apnea      Past Surgical History:   Procedure Laterality Date    ABLATION N/A 9/21/2023    Procedure: Ablation;  Surgeon: Andres Townsend MD;  Location: [facility] CATH LAB;  Service: Cardiology;  Laterality: N/A;  EPS + AF CATH ABLATION +/- CRYOBALLON ABLATION W/ DARBY    CARDIAC BIOPSY      COLONOSCOPY  12/21/2021    ECHOCARDIOGRAM,TRANSESOPHAGEAL N/A 9/21/2023    Procedure: Transesophageal echo (DARBY) intra-procedure log documentation;  Surgeon: Provider, [facility];  Location: [facility] CATH LAB;  Service: Cardiology;  Laterality: N/A;    INSERTION OF BIVENTRICULAR IMPLANTABLE CARDIOVERTER-DEFIBRILLATOR (ICD)      TRANSESOPHAGEAL ECHOCARDIOGRAPHY       Family History   Problem Relation Age of Onset    Valvular heart disease Mother     Coronary artery disease Brother         CABG     Social History     Tobacco Use    Smoking status: Never     Smokeless tobacco: Never   Substance Use Topics    Alcohol use: Never    Drug use: Never     Review of Systems   Constitutional:  Positive for chills and fatigue. Negative for fever.   HENT:  Positive for congestion. Negative for sore throat.    Respiratory:  Positive for cough. Negative for shortness of breath.    Cardiovascular:  Negative for chest pain.   Gastrointestinal:  Negative for nausea.   Genitourinary:  Negative for dysuria.   Musculoskeletal:  Negative for back pain.   Skin:  Negative for rash.   Neurological:  Negative for weakness.   Hematological:  Does not bruise/bleed easily.       Physical Exam     Initial Vitals [04/03/24 1706]   BP Pulse Resp Temp SpO2   112/81 70 20 97.9 °F (36.6 °C) 97 %      MAP       --         Physical Exam    Nursing note and vitals reviewed.  Constitutional: He appears well-developed and well-nourished. He is not diaphoretic. No distress.   HENT:   Head: Normocephalic and atraumatic.   Mouth/Throat: Mucous membranes are normal.   Eyes: Conjunctivae and EOM are normal. Pupils are equal, round, and reactive to light.   Neck: Neck supple.   Normal range of motion.  Cardiovascular:  Normal rate, regular rhythm, normal heart sounds and intact distal pulses.           No murmur heard.  Pulmonary/Chest: Breath sounds normal. No respiratory distress. He has no wheezes. He has no rales.   Abdominal: Abdomen is soft. Bowel sounds are normal. He exhibits no distension. There is no abdominal tenderness.   Musculoskeletal:         General: No tenderness or edema. Normal range of motion.      Cervical back: Normal range of motion and neck supple.     Neurological: He is alert and oriented to person, place, and time. He has normal strength. No cranial nerve deficit.   Skin: Skin is warm and dry. Capillary refill takes less than 2 seconds. No rash noted. No erythema.   Psychiatric: He has a normal mood and affect. His behavior is normal. Judgment and thought content normal.         ED  Course   Procedures  Labs Reviewed   COVID/FLU A&B PCR - Normal    Narrative:     The Xpert Xpress SARS-CoV-2/FLU/RSV plus is a rapid, multiplexed real-time PCR test intended for the simultaneous qualitative detection and differentiation of SARS-CoV-2, Influenza A, Influenza B, and respiratory syncytial virus (RSV) viral RNA in either nasopharyngeal swab or nasal swab specimens.                Imaging Results              X-Ray Chest PA And Lateral (Final result)  Result time 04/03/24 19:22:30      Final result by Rebecca Mathew MD (04/03/24 19:22:30)                   Impression:      No acute cardiopulmonary abnormality.      Electronically signed by: Rebecca Mathew  Date:    04/03/2024  Time:    19:22               Narrative:    EXAMINATION:  XR CHEST PA AND LATERAL    CLINICAL HISTORY:  fever, sob;    TECHNIQUE:  Two views of the chest    COMPARISON:  No relevant prior available at the time of dictation.    FINDINGS:  LINES AND TUBES: Dual lead cardiac pacer device is in place via left subclavian approach with leads overlying the right atrium and right ventricle.    MEDIASTINUM AND MARINA: Cardiac silhouette is at the upper limits of normal.    LUNGS: No lobar consolidation. No edema.    PLEURA:No pleural effusion. No pneumothorax.    BONES: No acute osseous abnormality.                                       Medications   dexAMETHasone injection 8 mg (8 mg Intramuscular Given 4/3/24 2011)     Medical Decision Making  Problems Addressed:  Cough, unspecified type: acute illness or injury that poses a threat to life or bodily functions  Upper respiratory tract infection, unspecified type: acute illness or injury that poses a threat to life or bodily functions  Viral syndrome: acute illness or injury that poses a threat to life or bodily functions    Amount and/or Complexity of Data Reviewed  Labs: ordered.  Radiology: ordered and independent interpretation performed.    Risk  OTC drugs.  Prescription drug  management.                          Medical Decision Making:   History:   I obtained history from: someone other than patient.       <> Summary of History: Collateral from the patient's wife at bedside.  Old Medical Records: I decided to obtain old medical records.  Old Records Summarized: records from previous admission(s), records from another hospital and records from clinic visits.       <> Summary of Records: Reviewed old records  Initial Assessment:   SOB  Differential Diagnosis:   Judging by the patient's chief complaint and pertinent history, the patient has the following possible differential diagnoses, including but not limited to the following.  Some of these are deemed to be lower likelihood and some more likely based on my physical exam and history combined with possible lab work and/or imaging studies.   Please see the pertinent studies, and refer to the HPI.  Some of these diagnoses will take further evaluation to fully rule out, perhaps as an outpatient and the patient was encouraged to follow up when discharged for more comprehensive evaluation.    pneumonia, bronchitis, viral syndrome, covid, flu, croup, pertussis, copd, asthma,  Independently Interpreted Test(s):   I have ordered and independently interpreted X-rays - see prior notes.  I have ordered and independently interpreted EKG Reading(s) - see prior notes  Clinical Tests:   Lab Tests: Reviewed and Ordered  Radiological Study: Ordered and Reviewed  ED Management:  Patient is a 53-year-old male presents to the emergency department for URI type symptoms.  See HPI.  See physical exam.  Reports congestion, cough, body aches, myalgias.  Wife also sick with similar symptoms.  COVID flu negative.  Chest x-ray without evidence of widened mediastinum, infiltrate or pneumothorax.  Suspect likely viral etiology of the patient's symptoms.  Will continue supportive care.  Patient given steroid shot.  Will place patient on albuterol, Flonase, is already  taking Xyzal.  Mucinex for decongestion.  Likely infectious viral process given wife also sick with similar symptoms.  Discussed all results with the patient.  Discussed need for follow-up with primary care provider.  Strict return precautions discussed.  Answered all questions at this time.  Hemodynamically stable for continued outpatient management with strict return precautions.  Patient verbalized understanding and agreed to plan as discussed.             Clinical Impression:  Final diagnoses:  [J06.9] Upper respiratory tract infection, unspecified type (Primary)  [B34.9] Viral syndrome  [R05.9] Cough, unspecified type          ED Disposition Condition    Discharge Stable          ED Prescriptions       Medication Sig Dispense Start Date End Date Auth. Provider    pseudoephedrine-guaiFENesin  mg (MUCINEX D)  mg per tablet Take 1 tablet by mouth 2 (two) times daily. for 10 days 20 tablet 4/3/2024 4/13/2024 Arturo Sosa MD    fluticasone propionate (FLONASE) 50 mcg/actuation nasal spray 1 spray (50 mcg total) by Each Nostril route once daily. for 15 days 15.8 mL 4/3/2024 4/18/2024 Arturo Sosa MD    albuterol (PROVENTIL/VENTOLIN HFA) 90 mcg/actuation inhaler Inhale 1-2 puffs into the lungs every 6 (six) hours as needed for Wheezing. Rescue 18 g 4/3/2024 5/3/2024 Arturo Sosa MD    predniSONE (DELTASONE) 50 MG Tab Take 1 tablet (50 mg total) by mouth once daily. for 5 days 5 tablet 4/3/2024 4/8/2024 Arturo Sosa MD    benzonatate (TESSALON) 100 MG capsule Take 1 capsule (100 mg total) by mouth 3 (three) times daily as needed for Cough. 20 capsule 4/3/2024 4/13/2024 Arturo Sosa MD          Follow-up Information       Follow up With Specialties Details Why Contact Info    Germania Fairbanks[facility] P [facility]   [address] [place] [id number]  [phone]      Primary Care  Call in 1 day  Please call [phone] for a primary care provider.             Ian  Arturo CHILD MD  04/04/24 0026

## 2024-04-04 NOTE — DISCHARGE INSTRUCTIONS
Follow-up with the primary care physician in 1-2 days.      Continue taking your Xyzal as prescribed.  Began using Flonase.    You may use albuterol inhaler 2 puffs every 4-6 hours as needed for cough, shortness of breath.  Began taking Mucinex.      Take steroids as prescribed.  Steroids may elevate your blood sugar.  Continue to closely monitor.    Return to the emergency department if any new or worsening symptoms, fever, nausea, vomiting, or any other concerns.

## 2024-05-14 ENCOUNTER — LAB VISIT (OUTPATIENT)
Dept: LAB | Facility: HOSPITAL | Age: 54
End: 2024-05-14
Attending: INTERNAL MEDICINE
Payer: COMMERCIAL

## 2024-05-14 DIAGNOSIS — R06.09 DOE (DYSPNEA ON EXERTION): ICD-10-CM

## 2024-05-14 DIAGNOSIS — I50.22 CHRONIC SYSTOLIC HEART FAILURE: Primary | ICD-10-CM

## 2024-05-14 DIAGNOSIS — I48.0 PAROXYSMAL ATRIAL FIBRILLATION: ICD-10-CM

## 2024-05-14 LAB
ANION GAP SERPL CALC-SCNC: 5 MEQ/L
BUN SERPL-MCNC: 18.5 MG/DL (ref 8.4–25.7)
CALCIUM SERPL-MCNC: 8.8 MG/DL (ref 8.4–10.2)
CHLORIDE SERPL-SCNC: 106 MMOL/L (ref 98–107)
CO2 SERPL-SCNC: 29 MMOL/L (ref 22–29)
CREAT SERPL-MCNC: 1.59 MG/DL (ref 0.73–1.18)
CREAT/UREA NIT SERPL: 12
GFR SERPLBLD CREATININE-BSD FMLA CKD-EPI: 52 ML/MIN/1.73/M2
GLUCOSE SERPL-MCNC: 97 MG/DL (ref 74–100)
POTASSIUM SERPL-SCNC: 4.1 MMOL/L (ref 3.5–5.1)
SODIUM SERPL-SCNC: 140 MMOL/L (ref 136–145)

## 2024-05-14 PROCEDURE — 80048 BASIC METABOLIC PNL TOTAL CA: CPT

## 2024-05-14 PROCEDURE — 36415 COLL VENOUS BLD VENIPUNCTURE: CPT

## 2024-05-24 ENCOUNTER — TELEPHONE (OUTPATIENT)
Dept: FAMILY MEDICINE | Facility: CLINIC | Age: 54
End: 2024-05-24
Payer: COMMERCIAL

## 2024-05-28 ENCOUNTER — OFFICE VISIT (OUTPATIENT)
Dept: FAMILY MEDICINE | Facility: CLINIC | Age: 54
End: 2024-05-28
Payer: COMMERCIAL

## 2024-05-28 VITALS
DIASTOLIC BLOOD PRESSURE: 78 MMHG | OXYGEN SATURATION: 98 % | TEMPERATURE: 98 F | HEIGHT: 68 IN | BODY MASS INDEX: 28.81 KG/M2 | HEART RATE: 60 BPM | WEIGHT: 190.13 LBS | SYSTOLIC BLOOD PRESSURE: 121 MMHG

## 2024-05-28 DIAGNOSIS — I50.22 CHRONIC SYSTOLIC HEART FAILURE: Primary | ICD-10-CM

## 2024-05-28 DIAGNOSIS — I48.91 ATRIAL FIBRILLATION, UNSPECIFIED TYPE: ICD-10-CM

## 2024-05-28 PROCEDURE — 1160F RVW MEDS BY RX/DR IN RCRD: CPT | Mod: CPTII,,, | Performed by: NURSE PRACTITIONER

## 2024-05-28 PROCEDURE — 3074F SYST BP LT 130 MM HG: CPT | Mod: CPTII,,, | Performed by: NURSE PRACTITIONER

## 2024-05-28 PROCEDURE — 3078F DIAST BP <80 MM HG: CPT | Mod: CPTII,,, | Performed by: NURSE PRACTITIONER

## 2024-05-28 PROCEDURE — 1159F MED LIST DOCD IN RCRD: CPT | Mod: CPTII,,, | Performed by: NURSE PRACTITIONER

## 2024-05-28 PROCEDURE — 99212 OFFICE O/P EST SF 10 MIN: CPT | Mod: ,,, | Performed by: NURSE PRACTITIONER

## 2024-05-28 PROCEDURE — 3008F BODY MASS INDEX DOCD: CPT | Mod: CPTII,,, | Performed by: NURSE PRACTITIONER

## 2024-05-28 PROCEDURE — 4010F ACE/ARB THERAPY RXD/TAKEN: CPT | Mod: CPTII,,, | Performed by: NURSE PRACTITIONER

## 2024-05-28 RX ORDER — ONDANSETRON 4 MG/1
4 TABLET, FILM COATED ORAL EVERY 6 HOURS PRN
COMMUNITY

## 2024-05-29 PROBLEM — I50.22 CHRONIC SYSTOLIC HEART FAILURE: Status: ACTIVE | Noted: 2024-05-29

## 2024-05-29 NOTE — PROGRESS NOTES
"     Patient ID: [id number]     Chief Complaint: Follow-up (6 month HTN )      HPI:     Clifton Davidson is a 53 y.o. male here today for a follow up. No other complaints today.       Past Medical History:  has a past medical history of Chronic systolic heart failure, Digestive disorder, Fatigue, Lightheadedness, NICM (nonischemic cardiomyopathy), Palpitations, Paroxysmal atrial fibrillation, Personal history of colonic polyps, and Sleep apnea.    Surgical History:  has a past surgical history that includes Colonoscopy (12/21/2021); Insertion of biventricular implantable cardioverter-defibrillator (ICD); Transesophageal echocardiography; Cardiac biopsy; Ablation (N/A, 9/21/2023); and echocardiogram,transesophageal (N/A, 9/21/2023).    Family History: family history includes Coronary artery disease in his brother; Valvular heart disease in his mother.    Social History:  reports that he has never smoked. He has never used smokeless tobacco. He reports that he does not drink alcohol and does not use drugs.    Current Outpatient Medications   Medication Instructions    albuterol (PROVENTIL/VENTOLIN HFA) 90 mcg/actuation inhaler 1-2 puffs, Inhalation, Every 6 hours PRN, Rescue    aspirin (ECOTRIN) 325 mg, Oral, Daily    furosemide (LASIX) 40 mg, Oral, Daily    JARDIANCE 12.5 mg, Oral, Daily    omeprazole (PRILOSEC) 40 mg, Oral, Daily    ondansetron (ZOFRAN) 4 mg, Oral, Every 6 hours PRN    sacubitriL-valsartan (ENTRESTO) 49-51 mg per tablet 1 tablet, Oral, 2 times daily    sotaloL (BETAPACE) 120 mg, Oral, 2 times daily       Patient is allergic to crab and imdur [isosorbide mononitrate].     Patient Care Team:  Germania Fairbanks FNP as PCP - General (Nurse Practitioner)       Subjective:     Review of Systems    12 point review of systems conducted, negative except as stated in the history of present illness. See HPI for details.      Objective:     Visit Vitals  /78   Pulse 60   Temp 98.4 °F (36.9 °C)   Ht 5' 8" " (1.727 m)   Wt 86.2 kg (190 lb 1.6 oz)   SpO2 98%   BMI 28.90 kg/m²       Physical Exam    General: Alert and oriented, No acute distress.  Head: Normocephalic, Atraumatic.  Eye: Pupils are equal, round and reactive to light, Extraocular movements are intact, Sclera non-icteric.  Ears/Nose/Throat: Normal, Mucosa moist,Clear.  Neck/Thyroid: Supple, Non-tender, No carotid bruit, No lymphadenopathy, No JVD, Full range of motion.  Respiratory: Clear to auscultation bilaterally; No wheezes, rales or rhonchi,Non-labored respirations, Symmetrical chest wall expansion.  Cardiovascular: Regular rate and rhythm, S1/S2 normal, No murmurs, rubs or gallops.  Gastrointestinal: Soft, Non-tender, Non-distended, Normal bowel sounds, No palpable organomegaly.  Musculoskeletal: Normal range of motion.  Integumentary: Warm, Dry, Intact, No suspicious lesions or rashes.  Extremities: No clubbing, cyanosis or edema  Neurologic: No focal deficits, Cranial Nerves II-XII are grossly intact, Motor strength normal upper and lower extremities, Sensory exam intact.  Psychiatric: Normal interaction, Coherent speech, Euthymic mood, Appropriate affect     Labs Reviewed:     Chemistry:  Lab Results   Component Value Date     05/14/2024    K 4.1 05/14/2024    CHLORIDE 106 05/14/2024    BUN 18.5 05/14/2024    CREATININE 1.59 (H) 05/14/2024    EGFRNORACEVR 52 05/14/2024    GLUCOSE 97 05/14/2024    CALCIUM 8.8 05/14/2024    ALKPHOS 55 09/18/2023    LABPROT 7.3 09/18/2023    ALBUMIN 4.1 09/18/2023    BILIDIR 0.4 04/07/2022    IBILI 0.80 04/07/2022    AST 26 09/18/2023    ALT 15 09/18/2023    MG 2.00 09/18/2023    PHOS 2.8 07/27/2017    NDMUCAUM27WE 29.1 (L) 09/18/2023    TSH 1.224 09/18/2023    PSA 0.77 09/18/2023        Lab Results   Component Value Date    HGBA1C 5.3 09/18/2023        Hematology:  Lab Results   Component Value Date    WBC 6.29 09/18/2023    HGB 17.4 09/18/2023    HCT 54.0 (H) 09/18/2023     09/18/2023       Lipid  Panel:  Lab Results   Component Value Date    CHOL 153 09/18/2023    HDL 37 09/18/2023    LDL 99.00 09/18/2023    TRIG 84 09/18/2023    TOTALCHOLEST 4 09/18/2023        Urine:  Lab Results   Component Value Date    COLORUA Yellow 09/18/2023    APPEARANCEUA Clear 09/18/2023    SGUA 1.020 09/18/2023    PHUA 7.0 09/18/2023    PROTEINUA 30 (A) 09/18/2023    GLUCOSEUA >=1000 (A) 09/18/2023    KETONESUA Negative 09/18/2023    BLOODUA Negative 09/18/2023    NITRITESUA Negative 09/18/2023    LEUKOCYTESUR Negative 09/18/2023    RBCUA None Seen 09/18/2023    WBCUA None Seen 09/18/2023    BACTERIA None Seen 09/18/2023    CREATRANDUR 94.6 09/18/2023    PROTEINURINE <5.0 07/27/2017          Assessment:       ICD-10-CM ICD-9-CM   1. Chronic systolic heart failure  I50.22 428.22   2. Atrial fibrillation, unspecified type  I48.91 427.31        Plan:   1. Chronic systolic heart failure  Controlled.  Keep scheduled follow-up appointment with Cardiology.  ED precautions.    2. Atrial fibrillation, unspecified type  Controlled.  Keep scheduled follow-up appointment with Cardiology.  ED precautions.         Follow up in about 6 months (around 11/28/2024) for Wellness. In addition to their scheduled follow up, the patient has also been instructed to follow up on as needed basis.     Future Appointments   Date Time Provider Department Center   12/3/2024  2:00 PM Germania Fairbanks FNP [facility]        [facility]

## 2024-08-29 ENCOUNTER — HOSPITAL ENCOUNTER (EMERGENCY)
Facility: HOSPITAL | Age: 54
Discharge: HOME | End: 2024-08-29
Attending: EMERGENCY MEDICINE
Payer: MEDICAID

## 2024-08-29 VITALS
TEMPERATURE: 99 F | BODY MASS INDEX: 23.64 KG/M2 | HEIGHT: 68 IN | OXYGEN SATURATION: 98 % | DIASTOLIC BLOOD PRESSURE: 75 MMHG | HEART RATE: 76 BPM | RESPIRATION RATE: 18 BRPM | SYSTOLIC BLOOD PRESSURE: 112 MMHG | WEIGHT: 156 LBS

## 2024-08-29 DIAGNOSIS — J06.9 VIRAL URI WITH COUGH: Primary | ICD-10-CM

## 2024-08-29 PROCEDURE — 63600175 PHARM REV CODE 636 W HCPCS

## 2024-08-29 PROCEDURE — 96372 THER/PROPH/DIAG INJ SC/IM: CPT

## 2024-08-29 PROCEDURE — 99284 EMERGENCY DEPT VISIT MOD MDM: CPT | Mod: 25

## 2024-08-29 PROCEDURE — 0240U COVID/FLU A&B PCR: CPT

## 2024-08-29 RX ORDER — METHYLPREDNISOLONE 4 MG/1
TABLET ORAL
Qty: 21 TABLET | Refills: 0 | Status: SHIPPED | OUTPATIENT
Start: 2024-08-29 | End: 2024-09-19

## 2024-08-29 RX ORDER — DEXAMETHASONE SODIUM PHOSPHATE 4 MG/ML
8 INJECTION, SOLUTION INTRA-ARTICULAR; INTRALESIONAL; INTRAMUSCULAR; INTRAVENOUS; SOFT TISSUE
Status: COMPLETED | OUTPATIENT
Start: 2024-08-29 | End: 2024-08-29

## 2024-08-29 RX ADMIN — DEXAMETHASONE SODIUM PHOSPHATE 8 MG: 4 INJECTION, SOLUTION INTRA-ARTICULAR; INTRALESIONAL; INTRAMUSCULAR; INTRAVENOUS; SOFT TISSUE at 02:08

## 2024-08-29 NOTE — DISCHARGE INSTRUCTIONS
Patient will be discharged home.  Claritin D 24 hour once daily over-the-counter for 10 days.  Stop if feel heart palpitations.  Previous chart states for you to stop the Xyzal.  Medrol Dosepak as directed.  Follow up as needed

## 2024-08-29 NOTE — ED PROVIDER NOTES
Encounter Date: 8/29/2024       History     Chief Complaint   Patient presents with    Cough     Cough and sinus drip.     Clifton, 53 year old male presents to the ER for sinus congestion, and cough.  Pt reports onset of symptoms 2 days ago.  Pt states he has this twice a year.  Took Catia-seltzer and albuterol this am.  Pt denies fever.  + PND, non- productive cough.  No edema.  No orthopnea or dyspnea on exertion.    All other review of systems within normal limits. GCS of 15 cranial nerves 2-12 intact.      Allergies include crabs  Past medical history CHF cardiomyopathy, chronic systolic heart failure, paroxysmal A-Fib sleep apnea   Past surgical history cardiac biopsy colonoscopy ICD an echo, cardiac ablation.     The history is provided by the patient. No  was used.     Review of patient's allergies indicates:   Allergen Reactions    Crab Swelling     Allergic to dungenous crab which causes lips to swell., no allergy to contrast media.    Imdur [isosorbide mononitrate] Other (See Comments)     Severe headaches     Past Medical History:   Diagnosis Date    Chronic systolic heart failure     Digestive disorder     acid reflux    Fatigue     Lightheadedness     NICM (nonischemic cardiomyopathy)     Palpitations     Paroxysmal atrial fibrillation     Personal history of colonic polyps 12/21/2021    Sleep apnea      Past Surgical History:   Procedure Laterality Date    ABLATION N/A 9/21/2023    Procedure: Ablation;  Surgeon: Andres Townsend MD;  Location: [facility] CATH LAB;  Service: Cardiology;  Laterality: N/A;  EPS + AF CATH ABLATION +/- CRYOBALLON ABLATION W/ DARBY    CARDIAC BIOPSY      COLONOSCOPY  12/21/2021    ECHOCARDIOGRAM,TRANSESOPHAGEAL N/A 9/21/2023    Procedure: Transesophageal echo (DARBY) intra-procedure log documentation;  [facility];  Location: [facility] CATH LAB;  Service: Cardiology;  Laterality: N/A;    INSERTION OF BIVENTRICULAR IMPLANTABLE  CARDIOVERTER-DEFIBRILLATOR (ICD)      TRANSESOPHAGEAL ECHOCARDIOGRAPHY       Family History   Problem Relation Name Age of Onset    Valvular heart disease Mother      Coronary artery disease Brother          CABG     Social History     Tobacco Use    Smoking status: Never    Smokeless tobacco: Never   Substance Use Topics    Alcohol use: Never    Drug use: Never     Review of Systems   Constitutional: Negative.    HENT:  Positive for congestion, postnasal drip, rhinorrhea, sinus pressure and sinus pain.    Respiratory:  Positive for cough.    Cardiovascular: Negative.    Neurological: Negative.    Hematological: Negative.    Psychiatric/Behavioral: Negative.     All other systems reviewed and are negative.      Physical Exam     Initial Vitals [08/29/24 1238]   BP Pulse Resp Temp SpO2   112/75 76 18 98.5 °F (36.9 °C) 98 %      MAP       --         Physical Exam    Nursing note and vitals reviewed.  Constitutional: He appears well-developed and well-nourished.   HENT:   Head: Normocephalic and atraumatic.   Right Ear: External ear normal.   Left Ear: External ear normal.   Clear fluid to bilateral canals and PND.  Nares patent.    Eyes: Conjunctivae and EOM are normal. Pupils are equal, round, and reactive to light.   Neck: Neck supple.   Normal range of motion.  Cardiovascular:  Normal rate, regular rhythm, normal heart sounds and intact distal pulses.           Pulmonary/Chest: Breath sounds normal.   Abdominal: Abdomen is soft. Bowel sounds are normal.   Musculoskeletal:         General: Normal range of motion.      Cervical back: Normal range of motion and neck supple.     Neurological: He is alert and oriented to person, place, and time. He has normal strength and normal reflexes. GCS score is 15. GCS eye subscore is 4. GCS verbal subscore is 5. GCS motor subscore is 6.   Skin: Skin is warm and dry. Capillary refill takes less than 2 seconds.   Psychiatric: He has a normal mood and affect. His behavior is  normal. Judgment and thought content normal.         ED Course   Procedures  Labs Reviewed   COVID/FLU A&B PCR - Normal       Result Value    Influenza A PCR Not Detected      Influenza B PCR Not Detected      SARS-CoV-2 PCR Not Detected      Narrative:     The Xpert Xpress SARS-CoV-2/FLU/RSV plus is a rapid, multiplexed real-time PCR test intended for the simultaneous qualitative detection and differentiation of SARS-CoV-2, Influenza A, Influenza B, and respiratory syncytial virus (RSV) viral RNA in either nasopharyngeal swab or nasal swab specimens.                Imaging Results    None          Medications   dexAMETHasone injection 8 mg (8 mg Intramuscular Given 8/29/24 1402)     Medical Decision Making  Medical decision-making    53 year old male presents to the ER for sinus congestion, and cough.  Pt reports onset of symptoms 2 days ago.  Pt states he has this twice a year.  Took Catia-seltzer and albuterol this am.  Pt denies fever.  + PND, non- productive cough.  No edema.  No orthopnea or dyspnea on exertion.    All other review of systems within normal limits. GCS of 15 cranial nerves 2-12 intact.        Differential diagnoses:  COVID, Bronchitis and PNA     Amount and/or Complexity of Data Reviewed  Labs:      Details: Covid -                                      Clinical Impression:  Final diagnoses:  [J06.9] Viral URI with cough (Primary)          ED Disposition Condition    Discharge Stable          ED Prescriptions       Medication Sig Dispense Start Date End Date Auth. Provider    methylPREDNISolone (MEDROL DOSEPACK) 4 mg tablet Take as directed. 21 tablet 8/29/2024 9/19/2024 Yvette Nevarez NP          Follow-up Information       Follow up With Specialties Details Why Contact Info    Germania Fairbanks, FNP Family Medicine  As needed [address] [facility]  [address]  [facility] [address]  [phone]               Yvette Nevarez NP  08/29/24 5961

## 2024-08-30 ENCOUNTER — PATIENT OUTREACH (OUTPATIENT)
Dept: EMERGENCY MEDICINE | Facility: HOSPITAL | Age: 54
End: 2024-08-30
Payer: MEDICAID

## 2024-08-30 ENCOUNTER — PATIENT OUTREACH (OUTPATIENT)
Dept: FAMILY MEDICINE | Facility: CLINIC | Age: 54
End: 2024-08-30
Payer: MEDICAID

## 2024-11-14 ENCOUNTER — LAB VISIT (OUTPATIENT)
Dept: LAB | Facility: HOSPITAL | Age: 54
End: 2024-11-14
Attending: INTERNAL MEDICINE
Payer: MEDICAID

## 2024-11-14 DIAGNOSIS — R06.09 DYSPNEA ON EXERTION: ICD-10-CM

## 2024-11-14 DIAGNOSIS — I48.0 PAROXYSMAL ATRIAL FIBRILLATION: ICD-10-CM

## 2024-11-14 DIAGNOSIS — I42.8 OBSCURE CARDIOMYOPATHY OF AFRICA: Primary | ICD-10-CM

## 2024-11-14 LAB
ALBUMIN SERPL-MCNC: 3.8 G/DL (ref 3.5–5)
ALBUMIN/GLOB SERPL: 1.4 RATIO (ref 1.1–2)
ALP SERPL-CCNC: 59 UNIT/L (ref 40–150)
ALT SERPL-CCNC: 15 UNIT/L (ref 0–55)
ANION GAP SERPL CALC-SCNC: 5 MEQ/L
AST SERPL-CCNC: 20 UNIT/L (ref 5–34)
BILIRUB SERPL-MCNC: 0.9 MG/DL
BUN SERPL-MCNC: 15.5 MG/DL (ref 8.4–25.7)
CALCIUM SERPL-MCNC: 9.2 MG/DL (ref 8.4–10.2)
CHLORIDE SERPL-SCNC: 104 MMOL/L (ref 98–107)
CO2 SERPL-SCNC: 29 MMOL/L (ref 22–29)
CREAT SERPL-MCNC: 1.43 MG/DL (ref 0.72–1.25)
CREAT UR-MCNC: 235.7 MG/DL (ref 63–166)
CREAT/UREA NIT SERPL: 11
GFR SERPLBLD CREATININE-BSD FMLA CKD-EPI: 59 ML/MIN/1.73/M2
GLOBULIN SER-MCNC: 2.7 GM/DL (ref 2.4–3.5)
GLUCOSE SERPL-MCNC: 98 MG/DL (ref 74–100)
POTASSIUM SERPL-SCNC: 4.2 MMOL/L (ref 3.5–5.1)
PROT SERPL-MCNC: 6.5 GM/DL (ref 6.4–8.3)
PROT UR STRIP-MCNC: 27.5 MG/DL
SODIUM SERPL-SCNC: 138 MMOL/L (ref 136–145)
URINE PROTEIN/CREATININE RATIO (OLG): 0.1

## 2024-11-14 PROCEDURE — 80053 COMPREHEN METABOLIC PANEL: CPT

## 2024-11-14 PROCEDURE — 36415 COLL VENOUS BLD VENIPUNCTURE: CPT

## 2024-11-14 PROCEDURE — 82570 ASSAY OF URINE CREATININE: CPT

## 2024-11-25 ENCOUNTER — TELEPHONE (OUTPATIENT)
Dept: FAMILY MEDICINE | Facility: CLINIC | Age: 54
End: 2024-11-25
Payer: MEDICAID

## 2024-11-25 ENCOUNTER — HOSPITAL ENCOUNTER (EMERGENCY)
Facility: HOSPITAL | Age: 54
Discharge: HOME | End: 2024-11-25
Attending: SPECIALIST
Payer: MEDICAID

## 2024-11-25 VITALS
RESPIRATION RATE: 20 BRPM | OXYGEN SATURATION: 98 % | TEMPERATURE: 100 F | HEART RATE: 80 BPM | DIASTOLIC BLOOD PRESSURE: 87 MMHG | SYSTOLIC BLOOD PRESSURE: 122 MMHG | WEIGHT: 160 LBS | BODY MASS INDEX: 24.33 KG/M2

## 2024-11-25 DIAGNOSIS — J06.9 VIRAL URI WITH COUGH: Primary | ICD-10-CM

## 2024-11-25 DIAGNOSIS — Z00.00 WELLNESS EXAMINATION: Primary | ICD-10-CM

## 2024-11-25 PROCEDURE — 0240U COVID/FLU A&B PCR: CPT | Performed by: SPECIALIST

## 2024-11-25 PROCEDURE — 63600175 PHARM REV CODE 636 W HCPCS: Performed by: SPECIALIST

## 2024-11-25 PROCEDURE — 99284 EMERGENCY DEPT VISIT MOD MDM: CPT | Mod: 25

## 2024-11-25 PROCEDURE — 25000003 PHARM REV CODE 250: Performed by: SPECIALIST

## 2024-11-25 PROCEDURE — 96372 THER/PROPH/DIAG INJ SC/IM: CPT | Performed by: SPECIALIST

## 2024-11-25 RX ORDER — CHLOPHEDIANOL HCL AND PYRILAMINE MALEATE 12.5; 12.5 MG/5ML; MG/5ML
5 SOLUTION ORAL EVERY 8 HOURS PRN
Qty: 180 ML | Refills: 0 | Status: SHIPPED | OUTPATIENT
Start: 2024-11-25

## 2024-11-25 RX ORDER — GUAIFENESIN AND DEXTROMETHORPHAN HYDROBROMIDE 10; 100 MG/5ML; MG/5ML
10 SYRUP ORAL ONCE
Status: COMPLETED | OUTPATIENT
Start: 2024-11-25 | End: 2024-11-25

## 2024-11-25 RX ORDER — DEXAMETHASONE SODIUM PHOSPHATE 4 MG/ML
8 INJECTION, SOLUTION INTRA-ARTICULAR; INTRALESIONAL; INTRAMUSCULAR; INTRAVENOUS; SOFT TISSUE
Status: COMPLETED | OUTPATIENT
Start: 2024-11-25 | End: 2024-11-25

## 2024-11-25 RX ORDER — PREDNISONE 20 MG/1
20 TABLET ORAL 2 TIMES DAILY
Qty: 10 TABLET | Refills: 0 | Status: SHIPPED | OUTPATIENT
Start: 2024-11-25 | End: 2024-11-30

## 2024-11-25 RX ADMIN — GUAIFENESIN AND DEXTROMETHORPHAN 10 ML: 100; 10 SYRUP ORAL at 08:11

## 2024-11-25 RX ADMIN — DEXAMETHASONE SODIUM PHOSPHATE 8 MG: 4 INJECTION, SOLUTION INTRA-ARTICULAR; INTRALESIONAL; INTRAMUSCULAR; INTRAVENOUS; SOFT TISSUE at 08:11

## 2024-11-26 NOTE — ED PROVIDER NOTES
Encounter Date: 11/25/2024       History     Chief Complaint   Patient presents with    Influenza     S/s  / chills     54-year-old male with complaints of chills, body aches and nasal congestion; also ear pressure; states that he thinks he has flu, had fever yesterday but currently afebrile        Review of patient's allergies indicates:   Allergen Reactions    Crab Swelling     Allergic to dungenous crab which causes lips to swell., no allergy to contrast media.    Imdur [isosorbide mononitrate] Other (See Comments)     Severe headaches     Past Medical History:   Diagnosis Date    Chronic systolic heart failure     Digestive disorder     acid reflux    Fatigue     Lightheadedness     NICM (nonischemic cardiomyopathy)     Palpitations     Paroxysmal atrial fibrillation     Personal history of colonic polyps 12/21/2021    Sleep apnea      Past Surgical History:   Procedure Laterality Date    ABLATION N/A 9/21/2023    Procedure: Ablation;  Surgeon: Andres Townsend MD;  Location: [facility] CATH LAB;  Service: Cardiology;  Laterality: N/A;  EPS + AF CATH ABLATION +/- CRYOBALLON ABLATION W/ DARBY    CARDIAC BIOPSY      COLONOSCOPY  12/21/2021    ECHOCARDIOGRAM,TRANSESOPHAGEAL N/A 9/21/2023    Procedure: Transesophageal echo (DARBY) intra-procedure log documentation;  Surgeon: Provider, [facility];  Location: [facility] CATH LAB;  Service: Cardiology;  Laterality: N/A;    INSERTION OF BIVENTRICULAR IMPLANTABLE CARDIOVERTER-DEFIBRILLATOR (ICD)      TRANSESOPHAGEAL ECHOCARDIOGRAPHY       Family History   Problem Relation Name Age of Onset    Valvular heart disease Mother      Coronary artery disease Brother          CABG     Social History     Tobacco Use    Smoking status: Never    Smokeless tobacco: Never   Substance Use Topics    Alcohol use: Never    Drug use: Never     Review of Systems   Constitutional: Negative.    HENT: Negative.     Respiratory: Negative.     Cardiovascular: Negative.    Gastrointestinal: Negative.     Genitourinary: Negative.    Musculoskeletal: Negative.    Neurological: Negative.        Physical Exam     Initial Vitals [11/25/24 1852]   BP Pulse Resp Temp SpO2   122/87 80 20 99.8 °F (37.7 °C) 98 %      MAP       --         Physical Exam    Nursing note and vitals reviewed.  Constitutional: He appears well-developed and well-nourished.   HENT:   Head: Normocephalic and atraumatic.   Nose: Nose normal.   Eyes: EOM are normal. Pupils are equal, round, and reactive to light.   Neck: Neck supple.   Normal range of motion.  Cardiovascular:  Normal rate, regular rhythm and normal heart sounds.           Pulmonary/Chest: Breath sounds normal. He has no wheezes.   Abdominal: Abdomen is soft. Bowel sounds are normal. There is no abdominal tenderness.   Musculoskeletal:         General: Normal range of motion.      Cervical back: Normal range of motion and neck supple.     Neurological: He is alert and oriented to person, place, and time. GCS score is 15. GCS eye subscore is 4. GCS verbal subscore is 5. GCS motor subscore is 6.   Skin: Skin is warm and dry.         ED Course   Procedures  Labs Reviewed   COVID/FLU A&B PCR - Normal       Result Value    Influenza A PCR Not Detected      Influenza B PCR Not Detected      SARS-CoV-2 PCR Not Detected      Narrative:     The Xpert Xpress SARS-CoV-2/FLU/RSV plus is a rapid, multiplexed real-time PCR test intended for the simultaneous qualitative detection and differentiation of SARS-CoV-2, Influenza A, Influenza B, and respiratory syncytial virus (RSV) viral RNA in either nasopharyngeal swab or nasal swab specimens.                Imaging Results    None          Medications   dexAMETHasone injection 8 mg (8 mg Intramuscular Given 11/25/24 2023)   dextromethorphan-guaiFENesin  mg/5 ml liquid 10 mL (10 mLs Oral Given 11/25/24 2027)     Medical Decision Making  54-year-old male with complaints of chills, body aches and nasal congestion; also ear pressure; states that he  thinks he has flu, had fever yesterday but currently afebrile    DIFFERENTIAL DIAGNOSIS- acute URI, COVID, flu    Amount and/or Complexity of Data Reviewed  Labs: ordered. Decision-making details documented in ED Course.    Risk  OTC drugs.  Prescription drug management.  Risk Details: Workup negative; patient given Decadron 8 mg IM and Robitussin DM in the emergency room; prescription for prednisone and ninja cough sent to his pharmacy; discussed adequate hydration and fever control                     The patient is resting comfortably and in no acute distress.   He states that his symptoms have improved after treatment in Emergency Department. I personally discussed his test results and treatment plan.  Gave strict ED precautions.  Specific conditions for return to the emergency department and importance of follow up with his primary care provided or the physician listed on the discharge instructions.  Patient voices understanding and agrees to the plan discussed. All patients' questions have been answered at this time.   He has remained hemodynamically stable throughout entire stay in ED and is stable for discharge home.                 Clinical Impression:  Final diagnoses:  [J06.9] Viral URI with cough (Primary)          ED Disposition Condition    Discharge Stable          ED Prescriptions       Medication Sig Dispense Start Date End Date Auth. Provider    pyrilamine-chlophedianoL (NINJACOF) 12.5-12.5 mg/5 mL Liqd Take 5 mLs by mouth every 8 (eight) hours as needed (Cough). 180 mL 11/25/2024 -- Tremaine Mauro MD    predniSONE (DELTASONE) 20 MG tablet Take 1 tablet (20 mg total) by mouth 2 (two) times daily. for 5 days 10 tablet 11/25/2024 11/30/2024 Tremaine Mauro MD          Follow-up Information       Follow up With Specialties Details Why Contact Info    Germania Fairbanks, P Family Medicine In 2 days As needed [address] [facility]  [address] [facility] [id number] [phone]      [facility] -  Emergency Dept Emergency Medicine  As needed [address] [facility] [address]  [phone]             Tremaine Mauro MD  11/25/24 1005

## 2024-11-27 ENCOUNTER — LAB VISIT (OUTPATIENT)
Dept: LAB | Facility: HOSPITAL | Age: 54
End: 2024-11-27
Attending: NURSE PRACTITIONER
Payer: MEDICAID

## 2024-11-27 DIAGNOSIS — Z00.00 WELLNESS EXAMINATION: ICD-10-CM

## 2024-11-27 LAB
25(OH)D3+25(OH)D2 SERPL-MCNC: 18 NG/ML (ref 30–80)
ALBUMIN SERPL-MCNC: 3.5 G/DL (ref 3.5–5)
ALBUMIN/GLOB SERPL: 1.2 RATIO (ref 1.1–2)
ALP SERPL-CCNC: 53 UNIT/L (ref 40–150)
ALT SERPL-CCNC: 15 UNIT/L (ref 0–55)
ANION GAP SERPL CALC-SCNC: 7 MEQ/L
AST SERPL-CCNC: 25 UNIT/L (ref 5–34)
BASOPHILS # BLD AUTO: 0.02 X10(3)/MCL
BASOPHILS NFR BLD AUTO: 0.2 %
BILIRUB SERPL-MCNC: 0.9 MG/DL
BUN SERPL-MCNC: 25.1 MG/DL (ref 8.4–25.7)
CALCIUM SERPL-MCNC: 8.8 MG/DL (ref 8.4–10.2)
CHLORIDE SERPL-SCNC: 105 MMOL/L (ref 98–107)
CHOLEST SERPL-MCNC: 161 MG/DL
CHOLEST/HDLC SERPL: 4 {RATIO} (ref 0–5)
CO2 SERPL-SCNC: 30 MMOL/L (ref 22–29)
CREAT SERPL-MCNC: 1.49 MG/DL (ref 0.72–1.25)
CREAT UR-MCNC: 190.5 MG/DL (ref 63–166)
CREAT/UREA NIT SERPL: 17
EOSINOPHIL # BLD AUTO: 0.02 X10(3)/MCL (ref 0–0.9)
EOSINOPHIL NFR BLD AUTO: 0.2 %
ERYTHROCYTE [DISTWIDTH] IN BLOOD BY AUTOMATED COUNT: 17.8 % (ref 11.5–17)
EST. AVERAGE GLUCOSE BLD GHB EST-MCNC: 114 MG/DL
GFR SERPLBLD CREATININE-BSD FMLA CKD-EPI: 56 ML/MIN/1.73/M2
GLOBULIN SER-MCNC: 3 GM/DL (ref 2.4–3.5)
GLUCOSE SERPL-MCNC: 88 MG/DL (ref 74–100)
HBA1C MFR BLD: 5.6 %
HCT VFR BLD AUTO: 48.6 % (ref 42–52)
HDLC SERPL-MCNC: 43 MG/DL (ref 35–60)
HGB BLD-MCNC: 15.6 G/DL (ref 14–18)
IMM GRANULOCYTES # BLD AUTO: 0.05 X10(3)/MCL (ref 0–0.04)
IMM GRANULOCYTES NFR BLD AUTO: 0.4 %
LDLC SERPL CALC-MCNC: 95 MG/DL (ref 50–140)
LYMPHOCYTES # BLD AUTO: 0.73 X10(3)/MCL (ref 0.6–4.6)
LYMPHOCYTES NFR BLD AUTO: 5.5 %
MCH RBC QN AUTO: 27.1 PG (ref 27–31)
MCHC RBC AUTO-ENTMCNC: 32.1 G/DL (ref 33–36)
MCV RBC AUTO: 84.4 FL (ref 80–94)
MICROALBUMIN UR-MCNC: 130.6 UG/ML
MICROALBUMIN/CREAT RATIO PNL UR: 68.6 MG/GM CR (ref 0–30)
MONOCYTES # BLD AUTO: 0.88 X10(3)/MCL (ref 0.1–1.3)
MONOCYTES NFR BLD AUTO: 6.7 %
NEUTROPHILS # BLD AUTO: 11.52 X10(3)/MCL (ref 2.1–9.2)
NEUTROPHILS NFR BLD AUTO: 87 %
NRBC BLD AUTO-RTO: 0 %
PLATELET # BLD AUTO: 193 X10(3)/MCL (ref 130–400)
PMV BLD AUTO: 11.1 FL (ref 7.4–10.4)
POTASSIUM SERPL-SCNC: 4.1 MMOL/L (ref 3.5–5.1)
PROT SERPL-MCNC: 6.5 GM/DL (ref 6.4–8.3)
PSA SERPL-MCNC: 1.22 NG/ML
RBC # BLD AUTO: 5.76 X10(6)/MCL (ref 4.7–6.1)
SODIUM SERPL-SCNC: 142 MMOL/L (ref 136–145)
TRIGL SERPL-MCNC: 113 MG/DL (ref 34–140)
TSH SERPL-ACNC: 1.04 UIU/ML (ref 0.35–4.94)
VLDLC SERPL CALC-MCNC: 23 MG/DL
WBC # BLD AUTO: 13.22 X10(3)/MCL (ref 4.5–11.5)

## 2024-11-27 PROCEDURE — 82306 VITAMIN D 25 HYDROXY: CPT

## 2024-11-27 PROCEDURE — 83036 HEMOGLOBIN GLYCOSYLATED A1C: CPT

## 2024-11-27 PROCEDURE — 84153 ASSAY OF PSA TOTAL: CPT

## 2024-11-27 PROCEDURE — 85025 COMPLETE CBC W/AUTO DIFF WBC: CPT

## 2024-11-27 PROCEDURE — 80061 LIPID PANEL: CPT

## 2024-11-27 PROCEDURE — 80053 COMPREHEN METABOLIC PANEL: CPT

## 2024-11-27 PROCEDURE — 84443 ASSAY THYROID STIM HORMONE: CPT

## 2024-11-27 PROCEDURE — 36415 COLL VENOUS BLD VENIPUNCTURE: CPT

## 2024-11-27 PROCEDURE — 82043 UR ALBUMIN QUANTITATIVE: CPT

## 2024-12-03 ENCOUNTER — OFFICE VISIT (OUTPATIENT)
Dept: FAMILY MEDICINE | Facility: CLINIC | Age: 54
End: 2024-12-03
Payer: MEDICAID

## 2024-12-03 VITALS
HEART RATE: 75 BPM | DIASTOLIC BLOOD PRESSURE: 75 MMHG | WEIGHT: 193.5 LBS | TEMPERATURE: 97 F | HEIGHT: 68 IN | SYSTOLIC BLOOD PRESSURE: 107 MMHG | BODY MASS INDEX: 29.33 KG/M2

## 2024-12-03 DIAGNOSIS — I48.0 PAROXYSMAL ATRIAL FIBRILLATION: ICD-10-CM

## 2024-12-03 DIAGNOSIS — J01.00 ACUTE NON-RECURRENT MAXILLARY SINUSITIS: ICD-10-CM

## 2024-12-03 DIAGNOSIS — R06.2 WHEEZING: ICD-10-CM

## 2024-12-03 DIAGNOSIS — I50.22 CHRONIC SYSTOLIC HEART FAILURE: ICD-10-CM

## 2024-12-03 DIAGNOSIS — Z00.00 WELLNESS EXAMINATION: Primary | ICD-10-CM

## 2024-12-03 PROCEDURE — 3044F HG A1C LEVEL LT 7.0%: CPT | Mod: CPTII,,, | Performed by: NURSE PRACTITIONER

## 2024-12-03 PROCEDURE — 99396 PREV VISIT EST AGE 40-64: CPT | Mod: ,,, | Performed by: NURSE PRACTITIONER

## 2024-12-03 PROCEDURE — 3074F SYST BP LT 130 MM HG: CPT | Mod: CPTII,,, | Performed by: NURSE PRACTITIONER

## 2024-12-03 PROCEDURE — 3008F BODY MASS INDEX DOCD: CPT | Mod: CPTII,,, | Performed by: NURSE PRACTITIONER

## 2024-12-03 PROCEDURE — 1159F MED LIST DOCD IN RCRD: CPT | Mod: CPTII,,, | Performed by: NURSE PRACTITIONER

## 2024-12-03 PROCEDURE — 3078F DIAST BP <80 MM HG: CPT | Mod: CPTII,,, | Performed by: NURSE PRACTITIONER

## 2024-12-03 PROCEDURE — 4010F ACE/ARB THERAPY RXD/TAKEN: CPT | Mod: CPTII,,, | Performed by: NURSE PRACTITIONER

## 2024-12-03 PROCEDURE — 1160F RVW MEDS BY RX/DR IN RCRD: CPT | Mod: CPTII,,, | Performed by: NURSE PRACTITIONER

## 2024-12-03 RX ORDER — VIT C/E/ZN/COPPR/LUTEIN/ZEAXAN 250MG-90MG
1000 CAPSULE ORAL DAILY
Qty: 30 CAPSULE | Refills: 11 | Status: SHIPPED | OUTPATIENT
Start: 2024-12-03

## 2024-12-03 RX ORDER — ALBUTEROL SULFATE 90 UG/1
1-2 INHALANT RESPIRATORY (INHALATION) EVERY 6 HOURS PRN
Qty: 18 G | Refills: 0 | Status: SHIPPED | OUTPATIENT
Start: 2024-12-03 | End: 2024-12-04

## 2024-12-03 RX ORDER — FLUCONAZOLE 150 MG/1
150 TABLET ORAL DAILY PRN
COMMUNITY
Start: 2024-11-22

## 2024-12-03 RX ORDER — AMOXICILLIN 500 MG/1
500 TABLET, FILM COATED ORAL EVERY 12 HOURS
Qty: 14 TABLET | Refills: 0 | Status: SHIPPED | OUTPATIENT
Start: 2024-12-03 | End: 2024-12-10

## 2024-12-04 RX ORDER — ALBUTEROL SULFATE 90 UG/1
1-2 INHALANT RESPIRATORY (INHALATION) EVERY 6 HOURS PRN
Qty: 18 G | Refills: 0 | Status: SHIPPED | OUTPATIENT
Start: 2024-12-04 | End: 2025-01-03

## 2024-12-05 NOTE — PROGRESS NOTES
Patient ID: [id number]     Chief Complaint: wellness with labs done  (Flu shot [facility] , patient says he has cough , pressure and congestion that not going away. (Er last week ))      HPI:     Clifton Davidson is a 54 y.o. male here today for an annual wellness visit.  He also presents with complaints of cough, nasal congestion, sinus pressure.  Patient was seen in ED last week for symptoms and was diagnosed with a viral upper respiratory infection with cough.  He was advised to take OTC medications for relief.  Patient reports no change in symptoms.  Denies fever, chest pain, and shortness of breath.      Health Maintenance   Topic Date Due    Hepatitis C Screening  Never done    HIV Screening  Never done    TETANUS VACCINE  Never done    Shingles Vaccine (1 of 2) Never done    Influenza Vaccine (1) 09/01/2024    COVID-19 Vaccine (5 - 2024-25 season) 09/01/2024    Hemoglobin A1c (Diabetic Prevention Screening)  11/27/2027    Lipid Panel  11/27/2029    Colorectal Cancer Screening  12/21/2031    RSV Vaccine (Age 60+ and Pregnant patients) (1 - 1-dose 75+ series) 09/29/2046    Pneumococcal Vaccines (Age 0-64)  Aged Out     Dental Exam - Recommend biannually  Vaccinations -   Immunization History   Administered Date(s) Administered    COVID-19 MRNA, LN-S PF (MODERNA HALF 0.25 ML DOSE) 11/11/2021    COVID-19, mRNA, LNP-S, PF (Moderna) Ages 12+ 11/16/2023    COVID-19, mRNA, LNP-S, bivalent booster, PF (PFIZER OMICRON) 12/16/2022    COVID-19, vector-nr, rS-Ad26, PF (Migdalia) 03/19/2021    Influenza (FLUBLOK) - Quadrivalent - Recombinant - PF *Preferred* (egg allergy) 10/06/2020    Influenza - Intranasal - Trivalent 11/19/2015    Influenza - Quadrivalent - High Dose - PF (65 years and older) 09/01/2015    Influenza - Quadrivalent - PF *Preferred* (6 months and older) 10/10/2019, 11/11/2021    Pneumococcal Polysaccharide - 23 Valent 11/06/2015        Past Medical History:  has a past medical history of Chronic  "systolic heart failure, Digestive disorder, Fatigue, Lightheadedness, NICM (nonischemic cardiomyopathy), Palpitations, Paroxysmal atrial fibrillation, Personal history of colonic polyps, and Sleep apnea.    Surgical History:  has a past surgical history that includes Colonoscopy (12/21/2021); Insertion of biventricular implantable cardioverter-defibrillator (ICD); Transesophageal echocardiography; Cardiac biopsy; Ablation (N/A, 9/21/2023); and echocardiogram,transesophageal (N/A, 9/21/2023).    Family History: family history includes Coronary artery disease in his brother; Valvular heart disease in his mother.    Social History:  reports that he has never smoked. He has never used smokeless tobacco. He reports that he does not drink alcohol and does not use drugs.    Current Outpatient Medications   Medication Instructions    albuterol (PROVENTIL/VENTOLIN HFA) 90 mcg/actuation inhaler 1-2 puffs, Inhalation, Every 6 hours PRN, Rescue    amoxicillin (AMOXIL) 500 mg, Oral, Every 12 hours    aspirin (ECOTRIN) 325 mg, Daily    cholecalciferol (vitamin D3) (VITAMIN D3) 1,000 Units, Oral, Daily    fluconazole (DIFLUCAN) 150 mg, Daily PRN    furosemide (LASIX) 40 mg, Daily    JARDIANCE 12.5 mg, Daily    methylPREDNISolone (MEDROL DOSEPACK) 4 mg tablet Take as directed.    omeprazole (PRILOSEC) 40 mg, Daily    pyrilamine-chlophedianoL (NINJACOF) 12.5-12.5 mg/5 mL Liqd 5 mLs, Oral, Every 8 hours PRN    sacubitriL-valsartan (ENTRESTO) 49-51 mg per tablet 1 tablet, 2 times daily    sotaloL (BETAPACE) 120 mg, 2 times daily       Patient is allergic to crab and imdur [isosorbide mononitrate].     Patient Care Team:  Germania Fairbanks FNP as PCP - General (Nurse Practitioner)       Subjective:     Review of Systems    12 point review of systems conducted, negative except as stated in the history of present illness. See HPI for details.      Objective:     Visit Vitals  /75   Pulse 75   Temp 97.3 °F (36.3 °C)   Ht 5' 8" " (1.727 m)   Wt 87.8 kg (193 lb 8 oz)   SpO2 (P) 99%   BMI 29.42 kg/m²       Physical Exam    General: Alert and oriented, No acute distress.  Head: Normocephalic, Atraumatic.  Eye: Pupils are equal, round and reactive to light, Extraocular movements are intact, Sclera non-icteric.  Ears/Nose/Throat: Normal, Mucosa moist,Clear.  Neck/Thyroid: Supple, Non-tender, No carotid bruit, No lymphadenopathy, No JVD, Full range of motion.  Respiratory: Clear to auscultation bilaterally; No wheezes, rales or rhonchi,Non-labored respirations, Symmetrical chest wall expansion.  Cardiovascular: Regular rate and rhythm, S1/S2 normal, No murmurs, rubs or gallops.  Gastrointestinal: Soft, Non-tender, Non-distended, Normal bowel sounds, No palpable organomegaly.  Musculoskeletal: Normal range of motion.  Integumentary: Warm, Dry, Intact, No suspicious lesions or rashes.  Extremities: No clubbing, cyanosis or edema  Neurologic: No focal deficits, Cranial Nerves II-XII are grossly intact, Motor strength normal upper and lower extremities, Sensory exam intact.  Psychiatric: Normal interaction, Coherent speech, Euthymic mood, Appropriate affect     Labs Reviewed:     Chemistry:  Lab Results   Component Value Date     11/27/2024    K 4.1 11/27/2024    BUN 25.1 11/27/2024    CREATININE 1.49 (H) 11/27/2024    EGFRNORACEVR 56 11/27/2024    GLUCOSE 88 11/27/2024    CALCIUM 8.8 11/27/2024    ALKPHOS 53 11/27/2024    LABPROT 6.5 11/27/2024    ALBUMIN 3.5 11/27/2024    BILIDIR 0.4 04/07/2022    IBILI 0.80 04/07/2022    AST 25 11/27/2024    ALT 15 11/27/2024    MG 2.00 09/18/2023    PHOS 2.8 07/27/2017    NIQXPVOC70MV 18 (L) 11/27/2024    TSH 1.036 11/27/2024    PSA 1.22 11/27/2024        Lab Results   Component Value Date    HGBA1C 5.6 11/27/2024        Hematology:  Lab Results   Component Value Date    WBC 13.22 (H) 11/27/2024    HGB 15.6 11/27/2024    HCT 48.6 11/27/2024     11/27/2024       Lipid Panel:  Lab Results   Component  Value Date    CHOL 161 11/27/2024    HDL 43 11/27/2024    LDL 95.00 11/27/2024    TRIG 113 11/27/2024    TOTALCHOLEST 4 11/27/2024        Urine:  Lab Results   Component Value Date    APPEARANCEUA Clear 09/18/2023    SGUA 1.020 09/18/2023    PROTEINUA 30 (A) 09/18/2023    KETONESUA Negative 09/18/2023    LEUKOCYTESUR Negative 09/18/2023    RBCUA None Seen 09/18/2023    WBCUA None Seen 09/18/2023    BACTERIA None Seen 09/18/2023    CREATRANDUR 190.5 (H) 11/27/2024    PROTEINURINE 27.5 11/14/2024    UPROTCREA 0.1 11/14/2024          Assessment:       ICD-10-CM ICD-9-CM   1. Wellness examination  Z00.00 V70.0   2. Paroxysmal atrial fibrillation  I48.0 427.31   3. Chronic systolic heart failure  I50.22 428.22   4. Acute non-recurrent maxillary sinusitis  J01.00 461.0   5. Wheezing  R06.2 786.07        Plan:   1. Wellness examination (Primary)  Healthy lifestyle discussed.  Influenza vaccination received 2 weeks ago and [facility] pharmacy.      2. Paroxysmal atrial fibrillation  Stable.  Managed by Cardiology.  Continue current medication regimen.  ED precautions.    3. Chronic systolic heart failure  Stable.  Managed by Cardiology.  Continue current medication regimen.  ED precautions.    4. Acute non-recurrent maxillary sinusitis  Use OTC cold meds for symptoms (HTN and DM pt to avoid Sudafed or pseudoephedrine products).  Use OTC Tylenol or Advil for fever or body aches.  Get plenty of rest, eat a healthy diet, avoid alcohol and smoking.  Sore Throat: Use OTC lozenges or throat sprays, gargle with warm salt and water, warm tea with honey.  Nasal Congestion: Use OTC Mucinex D, humidifier or steamy shower.  Cough: Use Dextromethorphan/Doxylamine Cough Syrup at night.  Report fever greater than 101 F, breathing problems, painful or worsening cough, or changes in mucous (green, yellow, bloody).  Cover your mouth with coughing, avoid sharing cups or lip balm, wash your hand before eating or touching your face.    -  amoxicillin (AMOXIL) 500 MG Tab; Take 1 tablet (500 mg total) by mouth every 12 (twelve) hours. for 7 days  Dispense: 14 tablet; Refill: 0    5. Wheezing  - albuterol (PROVENTIL/VENTOLIN HFA) 90 mcg/actuation inhaler; Inhale 1-2 puffs into the lungs every 6 (six) hours as needed for Wheezing. Rescue  Dispense: 18 g; Refill: 0    - amoxicillin (AMOXIL) 500 MG Tab; Take 1 tablet (500 mg total) by mouth every 12 (twelve) hours. for 7 days  Dispense: 14 tablet; Refill: 0         Follow up in about 6 months (around 6/3/2025) for HTN. In addition to their scheduled follow up, the patient has also been instructed to follow up on as needed basis.     Future Appointments   Date Time Provider Department Center   6/3/2025  2:45 PM Germania Fairbanks FNP [facility]   12/9/2025  2:30 PM Germania Fairbanks FNP [facility]        ERIK Jerome

## 2025-01-02 DIAGNOSIS — R06.2 WHEEZING: ICD-10-CM

## 2025-01-02 RX ORDER — ALBUTEROL SULFATE 90 UG/1
1-2 INHALANT RESPIRATORY (INHALATION) EVERY 6 HOURS PRN
Qty: 18 G | Refills: 3 | Status: SHIPPED | OUTPATIENT
Start: 2025-01-02 | End: 2025-02-01

## 2025-04-14 ENCOUNTER — LAB VISIT (OUTPATIENT)
Dept: LAB | Facility: HOSPITAL | Age: 55
End: 2025-04-14
Attending: NURSE PRACTITIONER
Payer: COMMERCIAL

## 2025-04-14 DIAGNOSIS — R06.09 DYSPNEA ON EXERTION: ICD-10-CM

## 2025-04-14 DIAGNOSIS — I50.22 CHRONIC SYSTOLIC HEART FAILURE: Primary | ICD-10-CM

## 2025-04-14 LAB
ANION GAP SERPL CALC-SCNC: 8 MEQ/L
BUN SERPL-MCNC: 20 MG/DL (ref 8.4–25.7)
CALCIUM SERPL-MCNC: 9.4 MG/DL (ref 8.4–10.2)
CHLORIDE SERPL-SCNC: 106 MMOL/L (ref 98–107)
CO2 SERPL-SCNC: 26 MMOL/L (ref 22–29)
CREAT SERPL-MCNC: 1.35 MG/DL (ref 0.72–1.25)
CREAT/UREA NIT SERPL: 15
GFR SERPLBLD CREATININE-BSD FMLA CKD-EPI: >60 ML/MIN/1.73/M2
GLUCOSE SERPL-MCNC: 115 MG/DL (ref 74–100)
POTASSIUM SERPL-SCNC: 3.8 MMOL/L (ref 3.5–5.1)
SODIUM SERPL-SCNC: 140 MMOL/L (ref 136–145)

## 2025-04-14 PROCEDURE — 80048 BASIC METABOLIC PNL TOTAL CA: CPT

## 2025-04-14 PROCEDURE — 36415 COLL VENOUS BLD VENIPUNCTURE: CPT

## 2025-05-21 ENCOUNTER — TELEPHONE (OUTPATIENT)
Dept: FAMILY MEDICINE | Facility: CLINIC | Age: 55
End: 2025-05-21
Payer: COMMERCIAL

## 2025-05-22 ENCOUNTER — TELEPHONE (OUTPATIENT)
Dept: FAMILY MEDICINE | Facility: CLINIC | Age: 55
End: 2025-05-22
Payer: COMMERCIAL

## 2025-05-22 NOTE — TELEPHONE ENCOUNTER
Pt informed Rx request was canceled due to having 11 refills sent in 12/2024. Pt was advised to contact pharmacy for further refills. Understanding voiced.----- Message from Martina sent at 5/21/2025  2:28 PM CDT -----  Pt needs a call back in regards to meds. Meds: Vitamin D . Patient states he has been taking this for awhile now, he recently went to get a refill, and he was told prescription has been canceled, and he doesn't know why. Please advise.

## 2025-06-03 ENCOUNTER — OFFICE VISIT (OUTPATIENT)
Dept: FAMILY MEDICINE | Facility: CLINIC | Age: 55
End: 2025-06-03
Payer: COMMERCIAL

## 2025-06-03 VITALS
SYSTOLIC BLOOD PRESSURE: 98 MMHG | RESPIRATION RATE: 18 BRPM | OXYGEN SATURATION: 98 % | TEMPERATURE: 98 F | WEIGHT: 190.69 LBS | HEART RATE: 63 BPM | DIASTOLIC BLOOD PRESSURE: 63 MMHG | HEIGHT: 68 IN | BODY MASS INDEX: 28.9 KG/M2

## 2025-06-03 DIAGNOSIS — I50.22 CHRONIC SYSTOLIC HEART FAILURE: Primary | ICD-10-CM

## 2025-06-03 DIAGNOSIS — F41.9 ANXIETY: ICD-10-CM

## 2025-06-03 DIAGNOSIS — I48.0 PAROXYSMAL ATRIAL FIBRILLATION: ICD-10-CM

## 2025-06-03 PROCEDURE — 3078F DIAST BP <80 MM HG: CPT | Mod: CPTII,,, | Performed by: NURSE PRACTITIONER

## 2025-06-03 PROCEDURE — G2211 COMPLEX E/M VISIT ADD ON: HCPCS | Mod: ,,, | Performed by: NURSE PRACTITIONER

## 2025-06-03 PROCEDURE — 99214 OFFICE O/P EST MOD 30 MIN: CPT | Mod: ,,, | Performed by: NURSE PRACTITIONER

## 2025-06-03 PROCEDURE — 1159F MED LIST DOCD IN RCRD: CPT | Mod: CPTII,,, | Performed by: NURSE PRACTITIONER

## 2025-06-03 PROCEDURE — 3074F SYST BP LT 130 MM HG: CPT | Mod: CPTII,,, | Performed by: NURSE PRACTITIONER

## 2025-06-03 PROCEDURE — 1160F RVW MEDS BY RX/DR IN RCRD: CPT | Mod: CPTII,,, | Performed by: NURSE PRACTITIONER

## 2025-06-03 PROCEDURE — 4010F ACE/ARB THERAPY RXD/TAKEN: CPT | Mod: CPTII,,, | Performed by: NURSE PRACTITIONER

## 2025-06-03 PROCEDURE — 3008F BODY MASS INDEX DOCD: CPT | Mod: CPTII,,, | Performed by: NURSE PRACTITIONER

## 2025-06-05 ENCOUNTER — TELEPHONE (OUTPATIENT)
Dept: FAMILY MEDICINE | Facility: CLINIC | Age: 55
End: 2025-06-05
Payer: COMMERCIAL

## 2025-06-06 DIAGNOSIS — F41.9 ANXIETY: Primary | ICD-10-CM

## 2025-06-06 RX ORDER — BUSPIRONE HYDROCHLORIDE 5 MG/1
5 TABLET ORAL 2 TIMES DAILY
Qty: 60 TABLET | Refills: 5 | Status: SHIPPED | OUTPATIENT
Start: 2025-06-06 | End: 2026-06-06

## 2025-09-03 ENCOUNTER — OFFICE VISIT (OUTPATIENT)
Dept: FAMILY MEDICINE | Facility: CLINIC | Age: 55
End: 2025-09-03
Payer: COMMERCIAL

## 2025-09-03 DIAGNOSIS — F41.9 ANXIETY: Primary | ICD-10-CM

## 2025-09-03 DIAGNOSIS — M25.512 ACUTE PAIN OF LEFT SHOULDER: ICD-10-CM

## 2025-09-03 PROCEDURE — 1160F RVW MEDS BY RX/DR IN RCRD: CPT | Mod: CPTII,,, | Performed by: NURSE PRACTITIONER

## 2025-09-03 PROCEDURE — 3074F SYST BP LT 130 MM HG: CPT | Mod: CPTII,,, | Performed by: NURSE PRACTITIONER

## 2025-09-03 PROCEDURE — 99214 OFFICE O/P EST MOD 30 MIN: CPT | Mod: ,,, | Performed by: NURSE PRACTITIONER

## 2025-09-03 PROCEDURE — 3008F BODY MASS INDEX DOCD: CPT | Mod: CPTII,,, | Performed by: NURSE PRACTITIONER

## 2025-09-03 PROCEDURE — 4010F ACE/ARB THERAPY RXD/TAKEN: CPT | Mod: CPTII,,, | Performed by: NURSE PRACTITIONER

## 2025-09-03 PROCEDURE — G2211 COMPLEX E/M VISIT ADD ON: HCPCS | Mod: ,,, | Performed by: NURSE PRACTITIONER

## 2025-09-03 PROCEDURE — 3078F DIAST BP <80 MM HG: CPT | Mod: CPTII,,, | Performed by: NURSE PRACTITIONER

## 2025-09-03 PROCEDURE — 1159F MED LIST DOCD IN RCRD: CPT | Mod: CPTII,,, | Performed by: NURSE PRACTITIONER

## 2025-09-03 RX ORDER — HYDROXYZINE HYDROCHLORIDE 25 MG/1
25 TABLET, FILM COATED ORAL 3 TIMES DAILY PRN
Qty: 60 TABLET | Refills: 3 | Status: SHIPPED | OUTPATIENT
Start: 2025-09-03

## 2025-09-03 RX ORDER — BUSPIRONE HYDROCHLORIDE 7.5 MG/1
7.5 TABLET ORAL 2 TIMES DAILY
Qty: 60 TABLET | Refills: 5 | Status: SHIPPED | OUTPATIENT
Start: 2025-09-03 | End: 2026-09-03

## 2025-09-04 ENCOUNTER — TELEPHONE (OUTPATIENT)
Dept: FAMILY MEDICINE | Facility: CLINIC | Age: 55
End: 2025-09-04
Payer: COMMERCIAL

## 2025-09-04 RX ORDER — PREDNISONE 20 MG/1
20 TABLET ORAL DAILY
Qty: 5 TABLET | Refills: 0 | Status: SHIPPED | OUTPATIENT
Start: 2025-09-04

## 2025-09-05 VITALS
HEART RATE: 73 BPM | WEIGHT: 189.88 LBS | RESPIRATION RATE: 18 BRPM | OXYGEN SATURATION: 98 % | BODY MASS INDEX: 28.78 KG/M2 | TEMPERATURE: 98 F | DIASTOLIC BLOOD PRESSURE: 78 MMHG | SYSTOLIC BLOOD PRESSURE: 112 MMHG | HEIGHT: 68 IN

## (undated) DEVICE — ELECTRODE REM PLYHSV RETURN 9

## (undated) DEVICE — CATH ACHIEVE ADVANCE 20MM

## (undated) DEVICE — NAMIC CONV KIT

## (undated) DEVICE — Device

## (undated) DEVICE — WIRE PGTL FLX SPRL  .025X230CM

## (undated) DEVICE — KIT ENSITE ELECTRODE SURFACE

## (undated) DEVICE — CATH INQUIRY EP 6F LG 2-5-2MM

## (undated) DEVICE — ADAPTER DBL MALE LL CLR POLY

## (undated) DEVICE — PAD HEARTSTART DEFIB ADULT

## (undated) DEVICE — INTRODUCER SHEATH 7F 11CM 35MM

## (undated) DEVICE — DRESSING TRANS 4X4 TEGADERM

## (undated) DEVICE — CATH VIEWFLEX XTRA ICE 9F 90CM

## (undated) DEVICE — SHEATH 10FR 11CM BRITE TIP

## (undated) DEVICE — TOWEL OR DISP STRL BLUE 4/PK

## (undated) DEVICE — NDL NRG TRNSPTAL 71CM

## (undated) DEVICE — SHEATH FLEXCATH STEERABLE 12F

## (undated) DEVICE — CATH DECAPOLAR 6FRX110CM

## (undated) DEVICE — DEVICE FLUSH SQZ CONT 1ML/HR

## (undated) DEVICE — FLUID DELIVERY SET WITH FILTER

## (undated) DEVICE — SHEATH 10FR 23CM BRITE TIP

## (undated) DEVICE — SHEATH 11FR 11CM BRITE TIP

## (undated) DEVICE — GUIDEWIRE INQWIRE SE 3MM JTIP

## (undated) DEVICE — CATH ARCTIC FRONT ADVANCE 28MM

## (undated) DEVICE — INTRODUCER SWARTZ SL0 8.5FR

## (undated) DEVICE — INTRO SHEATH FST 14FR 12X50CM